# Patient Record
Sex: MALE | Race: WHITE | NOT HISPANIC OR LATINO | URBAN - METROPOLITAN AREA
[De-identification: names, ages, dates, MRNs, and addresses within clinical notes are randomized per-mention and may not be internally consistent; named-entity substitution may affect disease eponyms.]

---

## 2018-09-14 ENCOUNTER — INPATIENT (INPATIENT)
Facility: HOSPITAL | Age: 63
LOS: 12 days | Discharge: HOME HEALTH | DRG: 950 | End: 2018-09-27
Attending: SPECIALIST | Admitting: STUDENT IN AN ORGANIZED HEALTH CARE EDUCATION/TRAINING PROGRAM
Payer: COMMERCIAL

## 2018-09-14 VITALS
HEIGHT: 67 IN | RESPIRATION RATE: 14 BRPM | WEIGHT: 158.51 LBS | HEART RATE: 83 BPM | DIASTOLIC BLOOD PRESSURE: 88 MMHG | TEMPERATURE: 98 F | SYSTOLIC BLOOD PRESSURE: 151 MMHG | OXYGEN SATURATION: 98 %

## 2018-09-14 DIAGNOSIS — M43.10 SPONDYLOLISTHESIS, SITE UNSPECIFIED: ICD-10-CM

## 2018-09-14 DIAGNOSIS — Z92.89 PERSONAL HISTORY OF OTHER MEDICAL TREATMENT: Chronic | ICD-10-CM

## 2018-09-14 DIAGNOSIS — I10 ESSENTIAL (PRIMARY) HYPERTENSION: ICD-10-CM

## 2018-09-14 DIAGNOSIS — M43.20 FUSION OF SPINE, SITE UNSPECIFIED: ICD-10-CM

## 2018-09-14 DIAGNOSIS — Z98.1 ARTHRODESIS STATUS: Chronic | ICD-10-CM

## 2018-09-14 DIAGNOSIS — N28.9 DISORDER OF KIDNEY AND URETER, UNSPECIFIED: ICD-10-CM

## 2018-09-14 RX ORDER — DOCUSATE SODIUM 100 MG
100 CAPSULE ORAL THREE TIMES A DAY
Qty: 0 | Refills: 0 | Status: DISCONTINUED | OUTPATIENT
Start: 2018-09-14 | End: 2018-09-27

## 2018-09-14 RX ORDER — OXYCODONE HYDROCHLORIDE 5 MG/1
5 TABLET ORAL EVERY 4 HOURS
Qty: 0 | Refills: 0 | Status: DISCONTINUED | OUTPATIENT
Start: 2018-09-14 | End: 2018-09-14

## 2018-09-14 RX ORDER — DIPHENHYDRAMINE HYDROCHLORIDE AND LIDOCAINE HYDROCHLORIDE AND ALUMINUM HYDROXIDE AND MAGNESIUM HYDRO
15 KIT
Qty: 0 | Refills: 0 | Status: DISCONTINUED | OUTPATIENT
Start: 2018-09-14 | End: 2018-09-27

## 2018-09-14 RX ORDER — LANOLIN ALCOHOL/MO/W.PET/CERES
3 CREAM (GRAM) TOPICAL AT BEDTIME
Qty: 0 | Refills: 0 | Status: DISCONTINUED | OUTPATIENT
Start: 2018-09-14 | End: 2018-09-27

## 2018-09-14 RX ORDER — OXYCODONE HYDROCHLORIDE 5 MG/1
10 TABLET ORAL EVERY 4 HOURS
Qty: 0 | Refills: 0 | Status: DISCONTINUED | OUTPATIENT
Start: 2018-09-14 | End: 2018-09-21

## 2018-09-14 RX ORDER — GABAPENTIN 400 MG/1
600 CAPSULE ORAL AT BEDTIME
Qty: 0 | Refills: 0 | Status: DISCONTINUED | OUTPATIENT
Start: 2018-09-14 | End: 2018-09-27

## 2018-09-14 RX ORDER — FEBUXOSTAT 40 MG/1
40 TABLET ORAL DAILY
Qty: 0 | Refills: 0 | Status: DISCONTINUED | OUTPATIENT
Start: 2018-09-14 | End: 2018-09-27

## 2018-09-14 RX ORDER — PANTOPRAZOLE SODIUM 20 MG/1
40 TABLET, DELAYED RELEASE ORAL
Qty: 0 | Refills: 0 | Status: DISCONTINUED | OUTPATIENT
Start: 2018-09-14 | End: 2018-09-27

## 2018-09-14 RX ORDER — AMLODIPINE BESYLATE 2.5 MG/1
5 TABLET ORAL DAILY
Qty: 0 | Refills: 0 | Status: DISCONTINUED | OUTPATIENT
Start: 2018-09-14 | End: 2018-09-15

## 2018-09-14 RX ORDER — ACETAMINOPHEN 500 MG
975 TABLET ORAL EVERY 8 HOURS
Qty: 0 | Refills: 0 | Status: DISCONTINUED | OUTPATIENT
Start: 2018-09-14 | End: 2018-09-18

## 2018-09-14 RX ORDER — POLYETHYLENE GLYCOL 3350 17 G/17G
17 POWDER, FOR SOLUTION ORAL DAILY
Qty: 0 | Refills: 0 | Status: DISCONTINUED | OUTPATIENT
Start: 2018-09-14 | End: 2018-09-16

## 2018-09-14 RX ORDER — GABAPENTIN 400 MG/1
100 CAPSULE ORAL DAILY
Qty: 0 | Refills: 0 | Status: DISCONTINUED | OUTPATIENT
Start: 2018-09-14 | End: 2018-09-17

## 2018-09-14 RX ORDER — SERTRALINE 25 MG/1
25 TABLET, FILM COATED ORAL DAILY
Qty: 0 | Refills: 0 | Status: DISCONTINUED | OUTPATIENT
Start: 2018-09-14 | End: 2018-09-27

## 2018-09-14 RX ORDER — CHLORHEXIDINE GLUCONATE 213 G/1000ML
15 SOLUTION TOPICAL
Qty: 0 | Refills: 0 | Status: DISCONTINUED | OUTPATIENT
Start: 2018-09-14 | End: 2018-09-18

## 2018-09-14 RX ORDER — DIAZEPAM 5 MG
2 TABLET ORAL EVERY 8 HOURS
Qty: 0 | Refills: 0 | Status: DISCONTINUED | OUTPATIENT
Start: 2018-09-14 | End: 2018-09-20

## 2018-09-14 RX ORDER — LOSARTAN POTASSIUM 100 MG/1
100 TABLET, FILM COATED ORAL DAILY
Qty: 0 | Refills: 0 | Status: DISCONTINUED | OUTPATIENT
Start: 2018-09-14 | End: 2018-09-15

## 2018-09-14 RX ORDER — SENNA PLUS 8.6 MG/1
2 TABLET ORAL AT BEDTIME
Qty: 0 | Refills: 0 | Status: DISCONTINUED | OUTPATIENT
Start: 2018-09-14 | End: 2018-09-20

## 2018-09-14 RX ADMIN — GABAPENTIN 600 MILLIGRAM(S): 400 CAPSULE ORAL at 22:37

## 2018-09-14 RX ADMIN — Medication 975 MILLIGRAM(S): at 22:37

## 2018-09-14 RX ADMIN — Medication 100 MILLIGRAM(S): at 23:27

## 2018-09-14 RX ADMIN — OXYCODONE HYDROCHLORIDE 10 MILLIGRAM(S): 5 TABLET ORAL at 22:37

## 2018-09-14 RX ADMIN — Medication 975 MILLIGRAM(S): at 23:10

## 2018-09-14 RX ADMIN — Medication 3 MILLIGRAM(S): at 23:12

## 2018-09-14 RX ADMIN — OXYCODONE HYDROCHLORIDE 10 MILLIGRAM(S): 5 TABLET ORAL at 23:10

## 2018-09-14 RX ADMIN — POLYETHYLENE GLYCOL 3350 17 GRAM(S): 17 POWDER, FOR SOLUTION ORAL at 23:12

## 2018-09-14 NOTE — H&P ADULT - NSHPPHYSICALEXAM_GEN_ALL_CORE
On Neurological Examination:  Mental Status - Patient is alert, awake, oriented X3. Speech is fluent, able to  name and repeat. Normal attention and concentration.  Follows commands well and able to answer questions appropriately. Mood and affect  normal.  Cranial Nerves - VFF, PERRL, EOMI, V1-V3 intact, no gross facial asymmetry, no dysarthria, no dysphagia,  Motor Exam -   Right upper  Left upper  Right lower  Left lower   Normal muscle bulk/tone  Sensory    Intact to light touch and pinprick bilaterally. Normal JPS, vibration.Normal cortical sensation  Coord: FTN intact bilaterally   No tremors  Gait -  normal , no ataxia   DTS Reflexes:           Toes are flexor     GENERAL Exam:     Nontoxic , No Acute Distress 	  HEENT:  normocephalic, atraumatic		  LUNGS:	Clear bilaterally  No Wheeze  Decreased bilaterally	  HEART:	 Normal S1S2   No murmur RRR      	  GI/ ABDOMEN:  Soft  Non tender  EXTREMITIES:   No Edema  No Clubbing  No Cyanosis No Edema  MUSCULOSKELETAL: Normal Range of Motion	   SKIN:      Normal   No Ecchymosis PHYSICAL EXAMINATION     General: NAD, Resting Comfortable,                                  HEENT: NC/AT, EOM I, PERRLA, Normal Conjunctivae  Cardio: RRR, Normal S1-S2, No M/G/R                              Pulm: No Respiratory Distress,  Lungs CTAB                        Abdomen: Distended / NT, Soft, BS+                                                MSK: No joint swelling, Full ROM of ext                                          Ext: No C/C/E, Pulses 2+ throughout, No calf tenderness    Skin:  all skin intact                                                                 Wounds: Surgical 3 - tegederm and gauze all look CDI   Decubitus Ulcers: None Present     Neurological Examination    Cognitive: AAO x 3                                                                                                                                                  CN II - XII  intact                                                                      Sensory: Intact to light touch                                                                                               Tone: normal Throughout     Motor    LEFT    UE: SF [5/5], EF [5/5], EE [5/5], WE [5/5],  [wnl]  RIGHT UE: SF [5/5], EF [5/5], EE [5/5], WE [5/5],  [wnl]  LEFT    LE:  HF [5/5], KE [5/5], DF [5/5], EHL [5/5],  PF [5/5]  RIGHT LE:  HF [5/5], KE [5/5], DF [5/5], EHL [5/5],  PF [5/5]      Reflex:  2 + thoroughout, Price/Babinski negative     Mood/Affect: wnl

## 2018-09-14 NOTE — H&P ADULT - NSHPLABSRESULTS_GEN_ALL_CORE
Labs from 9/13/18:   K 4.2, Co2 23.9, Chlor 108, Cr 1.28, Na 141, BUN 22, WBC 8.6, H/H 13.2/38.8, Plts 123

## 2018-09-14 NOTE — H&P ADULT - NSHPSOCIALHISTORY_GEN_ALL_CORE
Denies use of ETOH, illicit drug use and tobacco use.     Independent prior to current event and used a SC for ambulation. Lives with daughter in apartment with 1 flight of stairs.

## 2018-09-14 NOTE — H&P ADULT - NSHPREVIEWOFSYSTEMS_GEN_ALL_CORE
TODAY'S SUBJECTIVE & REVIEW OF SYMPTOMS:    [   ] Constitutional WNL  [   ] Cardio WNL  [   ] Resp WNL  [   ] GI WNL  [   ] Heme WNL  [   ] Endo WNL  [   ] Skin WNL  [   ] MSK WNL  [   ] Neuro WNL  [   ] Cognitive WNL  [   ] Psych WNL    Any major surgery within past 100 days?    YES/NO    Two or more falls within past one year?      YES/NO    One fall with injury within past one year?   YES/NO REVIEW OF SYSTEMS  Constitutional: No fever, No Chills, No fatigue  HEENT: No eye pain, No visual disturbances, No difficulty hearing, No Dysphagia   Pulm: No cough, No shortness of breath  Cardio: No chest pain, No palpitations  GI:  No abdominal pain, No nausea, No vomiting, No diarrhea, No constipation, No incontinence, Last Bowel Movement __   : No dysuria, No frequency, No hematuria, No incontinence  Neuro: No headaches, No memory loss, No loss of strength, No numbness, No tremors  Skin: No itching, No rashes, No lesions   Endo: No temperature intolerance  MSK: No joint pain, No joint swelling, No muscle pain, No Neck or back pain  Psych:  No depression, No anxiety REVIEW OF SYSTEMS  Constitutional: No fever, No Chills, No fatigue  HEENT: + dental pain   Pulm: No cough, No shortness of breath  Cardio: No chest pain, No palpitations  GI:  No abdominal pain, No nausea, No vomiting, No diarrhea, No constipation  : No dysuria, No frequency,   Neuro: No headaches, No memory loss, No loss of strength, +numbnes in left LE  Skin: No itching, +rashes, +lesions   Endo: No temperature intolerance  MSK: +joint pain, No joint swelling, No muscle pain, +back pain  Psych:  No depression, No anxiety

## 2018-09-14 NOTE — H&P ADULT - ASSESSMENT
This is a 64 y/o male with h/o HTN, gouty arthritis, lumbar disc disease, OA with L4-L5 spondylolisthesis with stenosis and radiculopathy s/p L4-5 laminectomy and decompression with transforaminal lumbar interbody fusion and placement of interbody cage and posterior spinal fusion L4-5 with functional deficits including gait and ADL impairments with decreased endurance and strength.     Precautions:                                                                                  Diet:     DVT Prophylaxis:                                                                          Medical Prognosis:     Prescreen Comparison: I have reviewed the prescreen information and I found no relevant changes between the preadmission  screening and my post admission evaluation.     Expected Therapy:   P.T.        hrs/day           O. T.      hrs/day           S.L.P.        hrs/day                    P&O                                                   Excpected Frequency: 5 days/7 day period    Rehab Potential:                                           Estimated Disposition:                          ELOS:              days      Rationale For Inpatient Rehab Admission- Patient demonstrates the following:     [X] Medically appropriate for rehabilitation admission   [X] Has attainable rehab goals with an approrpriate discharge plan  [X] Has rehabilitation potential (expected to make significant improvement within a reasonable period of time)  [X] Requires close medical management by a rehab physician, rehab nursing care and comprehensive interdisciplinary team (including         PT, OT, SLP and/or prosthetics and orthotics) This is a 64 y/o male with h/o HTN, gouty arthritis, lumbar disc disease, OA with L4-L5 spondylolisthesis with stenosis and radiculopathy s/p L4-5 laminectomy and decompression with transforaminal lumbar interbody fusion and placement of interbody cage and posterior spinal fusion L4-5 with functional deficits including gait and ADL impairments with decreased endurance and strength.     COMORBIDITES/ACTIVE MEDICAL ISSUES     Gait Instability, ADL impairments and Functional impairments: start Comprehensive Rehab Program of PT/OT     Pain Mgmt - Tylenol , Oxycodone PRN    GI/Bowel Mgmt -  Continent start Colace, Senna, Miralax PRN,    Precautions / PROPHYLAXIS:   - Falls, Spinal  - ortho: Weight bearing status: WBAT   - Lungs: Aspiration, Incentive Spirometer [patient instructed at the bedside]   - Pressure injury/Skin: Turn Q2hrs while in bed, OOB to Chair, PT/OT    - DVT: None (will need to clairfy) will give, SCDs, TEDs     MEDICAL PROGNOSIS: GOOD            REHAB POTENTIAL: GOOD             ESTIMATED DISPOSITION: HOME            ELOS: 10-14 Days   EXPECTED THERAPY:     P.T. 1hr/day       O.T. 1hr/day      S.L.P. 1hr/day     P&O Unnecessary     EXP FREQUENCY: 5 days per 7 day period     PRESCREEN COMPARISION:   I have reviewed the prescreen information and I have found no relevent changes between the preadmission screening and my post admission evaulation     RATIONALE FOR INPATIENT ADMISSION - Patient demonstrates the following: (check all that apply)  [X] Medically appropriate for rehabilitation admission  [X] Has attainable rehab goals with an appropriate initial discharge plan  [X] Has rehabilitation potential (expected to make a significant improvement within a reasonable period of time)   [X] Requires close medical managment by a rehab physician, rehab nursing care, Hospitalist and comprehensive interdisciplinary team (including PT, OT, & or SLP, Prosthetics and Orthotics) This is a 62 y/o male with h/o HTN, gouty arthritis, lumbar disc disease, OA with L4-L5 spondylolisthesis with stenosis and radiculopathy s/p L4-5 laminectomy and decompression with transforaminal lumbar interbody fusion and placement of interbody cage and posterior spinal fusion L4-5 with functional deficits including gait and ADL impairments with decreased endurance and strength.     COMORBIDITES/ACTIVE MEDICAL ISSUES     Gait Instability, ADL impairments and Functional impairments 2/2 recent Lami: start Comprehensive Rehab Program of PT/OT     Pain Mgmt - Tylenol , Oxycodone PRN    GI/Bowel Mgmt -  Continent start Colace, Senna, Miralax PRN,    Precautions / PROPHYLAXIS:   - Falls, Spinal  - ortho: Weight bearing status: WBAT   - Lungs: Aspiration, Incentive Spirometer [patient instructed at the bedside]   - Pressure injury/Skin: Turn Q2hrs while in bed, OOB to Chair, PT/OT    - DVT: None (will need to clairfy) will give, SCDs, TEDs     MEDICAL PROGNOSIS: GOOD            REHAB POTENTIAL: GOOD             ESTIMATED DISPOSITION: HOME            ELOS: 10-14 Days   EXPECTED THERAPY:     P.T. 1hr/day       O.T. 1hr/day      S.L.P. 1hr/day     P&O Unnecessary     EXP FREQUENCY: 5 days per 7 day period     PRESCREEN COMPARISION:   I have reviewed the prescreen information and I have found no relevent changes between the preadmission screening and my post admission evaulation     RATIONALE FOR INPATIENT ADMISSION - Patient demonstrates the following: (check all that apply)  [X] Medically appropriate for rehabilitation admission  [X] Has attainable rehab goals with an appropriate initial discharge plan  [X] Has rehabilitation potential (expected to make a significant improvement within a reasonable period of time)   [X] Requires close medical managment by a rehab physician, rehab nursing care, Hospitalist and comprehensive interdisciplinary team (including PT, OT, & or SLP, Prosthetics and Orthotics)

## 2018-09-14 NOTE — H&P ADULT - PMH
Allergic rhinitis    Cyst of left kidney    Essential hypertension    Gouty arthritis    Lumbar disc disease    Nephrolithiasis    Osteoarthritis    Psoriasis    Radiculopathy    Renal insufficiency    Spinal stenosis    Spondylolisthesis    Varicosities of leg

## 2018-09-14 NOTE — H&P ADULT - HISTORY OF PRESENT ILLNESS
This is a 64 y/o male with h/o HTN, gouty arthritis, lumbar disc disease, OA with L4-L5 spondylolisthesis with stenosis and radiculopathy s/p L4-5 laminectomy and decompression with transforaminal lumbar interbody fusion and placement of interbody cage and posterior spinal fusion L4-5 by Dr Orlando Neville on 9/10/18 at [facility].     Patient to continue use LSO and is full WBAT. See by physical and ocupational therapists. Pt is ambulating 30 ft with RW and min to mod A, min A for UE dressing, max A for LE dressing,     Indwelling multani catheter removed 9/11, passed TOV.       Labs from 9/13/18:   K 4.2, Co2 23.9, Chlor 108, Cr 1.28, Na 141, BUN 22, WBC 8.6, H/H 13.2/38.8, Plts 123 This is a 62 y/o male with h/o HTN, gouty arthritis, lumbar disc disease, OA with L4-L5 spondylolisthesis with stenosis and radiculopathy s/p L4-5 laminectomy and decompression with transforaminal lumbar interbody fusion and placement of interbody cage and posterior spinal fusion L4-5 by Dr Orlando Neville on 9/10/18 at [facility].     Patient to continue use LSO and is full WBAT. See by physical and ocupational therapists. Pt is ambulating 30 ft with RW and min to mod A, min A for UE dressing, max A for LE dressing,     Indwelling multani catheter removed 9/11, passed TOV.           Labs from 9/13/18:   K 4.2, Co2 23.9, Chlor 108, Cr 1.28, Na 141, BUN 22, WBC 8.6, H/H 13.2/38.8, Plts 123 (14 Sep 2018 15:02)      Any Major Surgery within past 100 days? Yes     Two or > Falls within past one year?  No   One Fall with injury past one year? No     FUNCTIONAL HISTORY      CURRENT FUNCTIONAL STATUS  - Bed Mobility: Min A  - Transfers: Min A  - Gait: Min A with RW

## 2018-09-15 DIAGNOSIS — R33.9 RETENTION OF URINE, UNSPECIFIED: ICD-10-CM

## 2018-09-15 DIAGNOSIS — N20.0 CALCULUS OF KIDNEY: ICD-10-CM

## 2018-09-15 LAB
ALBUMIN SERPL ELPH-MCNC: 2.7 G/DL — LOW (ref 3.3–5)
ALP SERPL-CCNC: 95 U/L — SIGNIFICANT CHANGE UP (ref 40–120)
ALT FLD-CCNC: 26 U/L DA — SIGNIFICANT CHANGE UP (ref 10–45)
ANION GAP SERPL CALC-SCNC: 8 MMOL/L — SIGNIFICANT CHANGE UP (ref 5–17)
AST SERPL-CCNC: 27 U/L — SIGNIFICANT CHANGE UP (ref 10–40)
BASOPHILS # BLD AUTO: 0.1 K/UL — SIGNIFICANT CHANGE UP (ref 0–0.2)
BASOPHILS NFR BLD AUTO: 0.8 % — SIGNIFICANT CHANGE UP (ref 0–2)
BILIRUB SERPL-MCNC: 0.9 MG/DL — SIGNIFICANT CHANGE UP (ref 0.2–1.2)
BUN SERPL-MCNC: 21 MG/DL — SIGNIFICANT CHANGE UP (ref 7–23)
CALCIUM SERPL-MCNC: 8.5 MG/DL — SIGNIFICANT CHANGE UP (ref 8.4–10.5)
CHLORIDE SERPL-SCNC: 105 MMOL/L — SIGNIFICANT CHANGE UP (ref 96–108)
CO2 SERPL-SCNC: 28 MMOL/L — SIGNIFICANT CHANGE UP (ref 22–31)
CREAT SERPL-MCNC: 1.3 MG/DL — SIGNIFICANT CHANGE UP (ref 0.5–1.3)
EOSINOPHIL # BLD AUTO: 0.1 K/UL — SIGNIFICANT CHANGE UP (ref 0–0.5)
EOSINOPHIL NFR BLD AUTO: 1.6 % — SIGNIFICANT CHANGE UP (ref 0–6)
GLUCOSE SERPL-MCNC: 129 MG/DL — HIGH (ref 70–99)
HCT VFR BLD CALC: 37.9 % — LOW (ref 39–50)
HGB BLD-MCNC: 13.2 G/DL — SIGNIFICANT CHANGE UP (ref 13–17)
LYMPHOCYTES # BLD AUTO: 1.5 K/UL — SIGNIFICANT CHANGE UP (ref 1–3.3)
LYMPHOCYTES # BLD AUTO: 17.5 % — SIGNIFICANT CHANGE UP (ref 13–44)
MAGNESIUM SERPL-MCNC: 1.8 MG/DL — SIGNIFICANT CHANGE UP (ref 1.6–2.6)
MCHC RBC-ENTMCNC: 32.1 PG — SIGNIFICANT CHANGE UP (ref 27–34)
MCHC RBC-ENTMCNC: 34.9 GM/DL — SIGNIFICANT CHANGE UP (ref 32–36)
MCV RBC AUTO: 91.9 FL — SIGNIFICANT CHANGE UP (ref 80–100)
MONOCYTES # BLD AUTO: 0.8 K/UL — SIGNIFICANT CHANGE UP (ref 0–0.9)
MONOCYTES NFR BLD AUTO: 9.8 % — SIGNIFICANT CHANGE UP (ref 2–14)
NEUTROPHILS # BLD AUTO: 6.1 K/UL — SIGNIFICANT CHANGE UP (ref 1.8–7.4)
NEUTROPHILS NFR BLD AUTO: 70.3 % — SIGNIFICANT CHANGE UP (ref 43–77)
PHOSPHATE SERPL-MCNC: 3.7 MG/DL — SIGNIFICANT CHANGE UP (ref 2.5–4.5)
PLATELET # BLD AUTO: 135 K/UL — LOW (ref 150–400)
POTASSIUM SERPL-MCNC: 4.1 MMOL/L — SIGNIFICANT CHANGE UP (ref 3.5–5.3)
POTASSIUM SERPL-SCNC: 4.1 MMOL/L — SIGNIFICANT CHANGE UP (ref 3.5–5.3)
PROT SERPL-MCNC: 6.3 G/DL — SIGNIFICANT CHANGE UP (ref 6–8.3)
RBC # BLD: 4.12 M/UL — LOW (ref 4.2–5.8)
RBC # FLD: 11 % — SIGNIFICANT CHANGE UP (ref 10.3–14.5)
SODIUM SERPL-SCNC: 141 MMOL/L — SIGNIFICANT CHANGE UP (ref 135–145)
WBC # BLD: 8.6 K/UL — SIGNIFICANT CHANGE UP (ref 3.8–10.5)
WBC # FLD AUTO: 8.6 K/UL — SIGNIFICANT CHANGE UP (ref 3.8–10.5)

## 2018-09-15 PROCEDURE — 99223 1ST HOSP IP/OBS HIGH 75: CPT

## 2018-09-15 PROCEDURE — 99223 1ST HOSP IP/OBS HIGH 75: CPT | Mod: GC

## 2018-09-15 RX ORDER — TAMSULOSIN HYDROCHLORIDE 0.4 MG/1
0.4 CAPSULE ORAL ONCE
Qty: 0 | Refills: 0 | Status: COMPLETED | OUTPATIENT
Start: 2018-09-15 | End: 2018-09-15

## 2018-09-15 RX ORDER — AMLODIPINE BESYLATE 2.5 MG/1
5 TABLET ORAL DAILY
Qty: 0 | Refills: 0 | Status: DISCONTINUED | OUTPATIENT
Start: 2018-09-15 | End: 2018-09-27

## 2018-09-15 RX ORDER — LOSARTAN POTASSIUM 100 MG/1
100 TABLET, FILM COATED ORAL DAILY
Qty: 0 | Refills: 0 | Status: DISCONTINUED | OUTPATIENT
Start: 2018-09-15 | End: 2018-09-17

## 2018-09-15 RX ORDER — TAMSULOSIN HYDROCHLORIDE 0.4 MG/1
0.4 CAPSULE ORAL AT BEDTIME
Qty: 0 | Refills: 0 | Status: DISCONTINUED | OUTPATIENT
Start: 2018-09-15 | End: 2018-09-25

## 2018-09-15 RX ORDER — MINERAL OIL
133 OIL (ML) MISCELLANEOUS ONCE
Qty: 0 | Refills: 0 | Status: COMPLETED | OUTPATIENT
Start: 2018-09-15 | End: 2018-09-15

## 2018-09-15 RX ADMIN — Medication 975 MILLIGRAM(S): at 13:00

## 2018-09-15 RX ADMIN — Medication 5 MILLIGRAM(S): at 17:13

## 2018-09-15 RX ADMIN — Medication 975 MILLIGRAM(S): at 06:37

## 2018-09-15 RX ADMIN — Medication 100 MILLIGRAM(S): at 22:09

## 2018-09-15 RX ADMIN — CHLORHEXIDINE GLUCONATE 15 MILLILITER(S): 213 SOLUTION TOPICAL at 06:37

## 2018-09-15 RX ADMIN — TAMSULOSIN HYDROCHLORIDE 0.4 MILLIGRAM(S): 0.4 CAPSULE ORAL at 22:09

## 2018-09-15 RX ADMIN — OXYCODONE HYDROCHLORIDE 10 MILLIGRAM(S): 5 TABLET ORAL at 09:15

## 2018-09-15 RX ADMIN — FEBUXOSTAT 40 MILLIGRAM(S): 40 TABLET ORAL at 12:14

## 2018-09-15 RX ADMIN — Medication 10 MILLIGRAM(S): at 22:10

## 2018-09-15 RX ADMIN — OXYCODONE HYDROCHLORIDE 10 MILLIGRAM(S): 5 TABLET ORAL at 08:45

## 2018-09-15 RX ADMIN — Medication 975 MILLIGRAM(S): at 12:59

## 2018-09-15 RX ADMIN — AMLODIPINE BESYLATE 5 MILLIGRAM(S): 2.5 TABLET ORAL at 06:37

## 2018-09-15 RX ADMIN — SERTRALINE 25 MILLIGRAM(S): 25 TABLET, FILM COATED ORAL at 12:14

## 2018-09-15 RX ADMIN — OXYCODONE HYDROCHLORIDE 10 MILLIGRAM(S): 5 TABLET ORAL at 13:00

## 2018-09-15 RX ADMIN — PANTOPRAZOLE SODIUM 40 MILLIGRAM(S): 20 TABLET, DELAYED RELEASE ORAL at 06:37

## 2018-09-15 RX ADMIN — TAMSULOSIN HYDROCHLORIDE 0.4 MILLIGRAM(S): 0.4 CAPSULE ORAL at 04:15

## 2018-09-15 RX ADMIN — Medication 100 MILLIGRAM(S): at 06:37

## 2018-09-15 RX ADMIN — Medication 100 MILLIGRAM(S): at 12:59

## 2018-09-15 RX ADMIN — Medication 975 MILLIGRAM(S): at 22:09

## 2018-09-15 RX ADMIN — Medication 133 MILLILITER(S): at 04:15

## 2018-09-15 RX ADMIN — Medication 3 MILLIGRAM(S): at 22:08

## 2018-09-15 RX ADMIN — Medication 975 MILLIGRAM(S): at 22:39

## 2018-09-15 RX ADMIN — GABAPENTIN 600 MILLIGRAM(S): 400 CAPSULE ORAL at 22:12

## 2018-09-15 RX ADMIN — Medication 975 MILLIGRAM(S): at 08:26

## 2018-09-15 RX ADMIN — GABAPENTIN 100 MILLIGRAM(S): 400 CAPSULE ORAL at 12:14

## 2018-09-15 RX ADMIN — OXYCODONE HYDROCHLORIDE 10 MILLIGRAM(S): 5 TABLET ORAL at 12:56

## 2018-09-15 RX ADMIN — CHLORHEXIDINE GLUCONATE 15 MILLILITER(S): 213 SOLUTION TOPICAL at 17:13

## 2018-09-15 RX ADMIN — SENNA PLUS 2 TABLET(S): 8.6 TABLET ORAL at 22:09

## 2018-09-15 NOTE — CHART NOTE - NSCHARTNOTEFT_GEN_A_CORE
Nurse called,     patient newly admitted, had a PVR > 500 and requesting SC order. Order placed. Shortly after, nurse unable to place mlutani. Patient was voiding spontaneous prior to discharge and arrival to [place]. Now I requested, reattempt with smaller Martiniquais and different Nurse to place.     At bedside, 14 Serbian. Patient resting comfortable. Nurse was able to pass 14 Serbian with little resistance and drainage of yellow urine filled basin. Starting flomax first now then qhs and will continue multani for now and have urology come assess in the am. Will additionally provide enema since patient last Bowel movement has seen been prolonged.

## 2018-09-15 NOTE — CONSULT NOTE ADULT - ASSESSMENT
62 y/o male with h/o HTN, gouty arthritis, lumbar disc disease, OA with L4-L5 spondylolisthesis with stenosis and radiculopathy s/p L4-5 laminectomy and decompression with transforaminal lumbar interbody fusion and placement of interbody cage and posterior spinal fusion L4-5 with functional deficits including gait and ADL impairments with decreased endurance and strength.     1. s/p laminectomy and decompression w transforaminal lumbar interbody fusion cw pt/ot and pain management  2. essential HTN: stable cw current meds  3. gout cw current meds stable  4. depression cw zoloft  5. dvt ppx: scds b/l 62 y/o male with h/o HTN, gouty arthritis, lumbar disc disease, OA with L4-L5 spondylolisthesis with stenosis and radiculopathy s/p L4-5 laminectomy and decompression with transforaminal lumbar interbody fusion and placement of interbody cage and posterior spinal fusion L4-5 with functional deficits including gait and ADL impairments with decreased endurance and strength.     1. s/p laminectomy and decompression w transforaminal lumbar interbody fusion cw pt/ot and pain management  2. essential HTN: stable cw current meds  3. gout cw current meds stable  4. depression cw zoloft  5. dvt ppx: scds b/l  6. urinary retention , bph cw flomax fu urology

## 2018-09-15 NOTE — CONSULT NOTE ADULT - SUBJECTIVE AND OBJECTIVE BOX
Patient is a 63y old  Male who presents with a chief complaint of s/p transforaminal, L4-5, lumbar interbody fusion (14 Sep 2018 15:02)      HPI:  This is a 64 y/o male with h/o HTN, gouty arthritis, lumbar disc disease, OA with L4-L5 spondylolisthesis with stenosis and radiculopathy s/p L4-5 laminectomy and decompression with transforaminal lumbar interbody fusion and placement of interbody cage and posterior spinal fusion L4-5 by Dr Orlando Neville on 9/10/18 at [facility].     Patient to continue use LSO and is full WBAT. See by physical and ocupational therapists. Pt is ambulating 30 ft with RW and min to mod A, min A for UE dressing, max A for LE dressing,     Indwelling multani catheter removed , passed TOV. last evening found to have retention, difficulty with straight cath. 14 Fr multani placed. patient has history of stone disease, denies prostate issues/voiding trouble. recent severe constipation             PAST MEDICAL & SURGICAL HISTORY:  Spinal stenosis  Radiculopathy  Spondylolisthesis  Varicosities of leg  Cyst of left kidney  Renal insufficiency  Psoriasis  Osteoarthritis  Nephrolithiasis  Lumbar disc disease  Gouty arthritis  Essential hypertension  Allergic rhinitis  S/P lumbar fusion  History of dental surgery      REVIEW OF SYSTEMS:    CONSTITUTIONAL:  no fever or chills  HEENT: No visual changes  ENDO: No sweating  NECK: No pain or stiffness  MUSCULOSKELETAL:  s/p laminectomy  RESPIRATORY: No shortness of breath  CARDIOVASCULAR: No chest pain  GASTROINTESTINAL: No abdominal or epigastric pain. No nausea, vomiting,  yes constipation.   NEUROLOGICAL: No mental status changes  PSYCH: No depression, no mood changes  SKIN: No itching      MEDICATIONS  (STANDING):  acetaminophen   Tablet .. 975 milliGRAM(s) Oral every 8 hours  amLODIPine   Tablet 5 milliGRAM(s) Oral daily  chlorhexidine 0.12% Liquid 15 milliLiter(s) Swish and Spit two times a day  docusate sodium 100 milliGRAM(s) Oral three times a day  febuxostat 40 milliGRAM(s) Oral daily  gabapentin 600 milliGRAM(s) Oral at bedtime  gabapentin 100 milliGRAM(s) Oral daily  losartan 100 milliGRAM(s) Oral daily  melatonin 3 milliGRAM(s) Oral at bedtime  pantoprazole    Tablet 40 milliGRAM(s) Oral before breakfast  senna 2 Tablet(s) Oral at bedtime  sertraline 25 milliGRAM(s) Oral daily  tamsulosin 0.4 milliGRAM(s) Oral at bedtime    MEDICATIONS  (PRN):  diazepam    Tablet 2 milliGRAM(s) Oral every 8 hours PRN spasm  FIRST- Mouthwash  BLM 15 milliLiter(s) Swish and Spit two times a day PRN Mouth Care  oxyCODONE    IR 5 milliGRAM(s) Oral every 4 hours PRN Moderate Pain (4 - 6)  oxyCODONE    IR 10 milliGRAM(s) Oral every 4 hours PRN Severe Pain (7 - 10)  polyethylene glycol 3350 17 Gram(s) Oral daily PRN Constipation      Allergies    No Known Allergies    Intolerances        SOCIAL HISTORY: No illicit drug use    FAMILY HISTORY:        T(C): 36.8 (09-15-18 @ 08:15), Max: 36.9 (18 @ 18:56)  T(F): 98.2 (09-15-18 @ 08:15), Max: 98.5 (18 @ 18:56)  HR: 83 (09-15-18 @ 08:15) (83 - 91)  BP: 112/70 (09-15-18 @ 08:15) (112/70 - 151/88)  RR: 14 (09-15-18 @ 08:15) (14 - 14)  SpO2: 94% (09-15-18 @ 08:15) (94% - 98%)      PHYSICAL EXAM:    Constitutional: alert, no acute distress  Back: No CVA tenderness  Respiratory: No accessory respiratory muscle use  Abd:  NT/ND  no organomegaly  obese, soft  : no bladder distention, multani yellow, uncirc testes benign  Extremities: no edema  Neurological: A/O x 3  Psychiatric: Normal mood, normal affect  Skin: No rashes      18 @ 07:01  -  09-15-18 @ 07:00  --------------------------------------------------------  IN:  Total IN: 0 mL    OUT:    Incontinent per Collection Ba mL  Total OUT: 600 mL    Total NET: -600 mL          Height (cm): 170.18 (18 @ 18:56)  Weight (kg): 71.9 (18 @ 18:56)  BMI (kg/m2): 24.8 (18 @ 18:56)  BSA (m2): 1.83 (18 @ 18:56)    LABS:                        13.2   8.6   )-----------( 135      ( 15 Sep 2018 06:20 )             37.9     09-15    141  |  105  |  21  ----------------------------<  129<H>  4.1   |  28  |  1.30    Ca    8.5      15 Sep 2018 06:20  Phos  3.7     09-15  Mg     1.8     09-15    TPro  6.3  /  Alb  2.7<L>  /  TBili  0.9  /  DBili  x   /  AST  27  /  ALT  26  /  AlkPhos  95  09-15              RADIOLOGY & ADDITIONAL STUDIES:

## 2018-09-15 NOTE — CONSULT NOTE ADULT - SUBJECTIVE AND OBJECTIVE BOX
Patient is a 63y old  Male who presents with a chief complaint of s/p transforaminal, L4-5, lumbar interbody fusion (15 Sep 2018 12:21)    HPI:  This is a 64 y/o male with h/o HTN, gouty arthritis, lumbar disc disease, OA with L4-L5 spondylolisthesis with stenosis and radiculopathy s/p L4-5 laminectomy and decompression with transforaminal lumbar interbody fusion and placement of interbody cage and posterior spinal fusion L4-5 by Dr Orlando Neville on 9/10/18 at [facility].     Patient to continue use LSO and is full WBAT. See by physical and ocupational therapists. Pt is ambulating 30 ft with RW and min to mod A, min A for UE dressing, max A for LE dressing,     Indwelling multani catheter removed 9/11, passed TOV.       Labs from 9/13/18:   K 4.2, Co2 23.9, Chlor 108, Cr 1.28, Na 141, BUN 22, WBC 8.6, H/H 13.2/38.8, Plts 123 (14 Sep 2018 15:02)    PAST MEDICAL & SURGICAL HISTORY:  Spinal stenosis  Radiculopathy  Spondylolisthesis  Varicosities of leg  Cyst of left kidney  Renal insufficiency  Psoriasis  Osteoarthritis  Nephrolithiasis  Lumbar disc disease  Gouty arthritis  Essential hypertension  Allergic rhinitis  S/P lumbar fusion  History of dental surgery    SOCIAL HISTORY:  Tobacco Usage:  ( x  ) never smoked   (   ) former smoker   (   ) current smoker  (     ) pack years    Tobacco Quit Date:  Substance Use (Street drugs): (x  ) never used  (  ) other:  Alcohol Usage: none    Family history reviewed and otherwise non-contributory    ALLERGIES:  No Known Allergies    MEDICATIONS  (STANDING):  acetaminophen   Tablet .. 975 milliGRAM(s) Oral every 8 hours  amLODIPine   Tablet 5 milliGRAM(s) Oral daily  bisacodyl 5 milliGRAM(s) Oral every 12 hours  chlorhexidine 0.12% Liquid 15 milliLiter(s) Swish and Spit two times a day  docusate sodium 100 milliGRAM(s) Oral three times a day  febuxostat 40 milliGRAM(s) Oral daily  gabapentin 600 milliGRAM(s) Oral at bedtime  gabapentin 100 milliGRAM(s) Oral daily  losartan 100 milliGRAM(s) Oral daily  melatonin 3 milliGRAM(s) Oral at bedtime  pantoprazole    Tablet 40 milliGRAM(s) Oral before breakfast  senna 2 Tablet(s) Oral at bedtime  sertraline 25 milliGRAM(s) Oral daily  tamsulosin 0.4 milliGRAM(s) Oral at bedtime    MEDICATIONS  (PRN):  bisacodyl Suppository 10 milliGRAM(s) Rectal once PRN Constipation  diazepam    Tablet 2 milliGRAM(s) Oral every 8 hours PRN spasm  FIRST- Mouthwash  BLM 15 milliLiter(s) Swish and Spit two times a day PRN Mouth Care  oxyCODONE    IR 5 milliGRAM(s) Oral every 4 hours PRN Moderate Pain (4 - 6)  oxyCODONE    IR 10 milliGRAM(s) Oral every 4 hours PRN Severe Pain (7 - 10)  polyethylene glycol 3350 17 Gram(s) Oral daily PRN Constipation    Review of Systems: Refer to HPI for pertinent positives and negatives. All other ROS reviewed and negative except as otherwise stated above.    Vital Signs Last 24 Hrs  T(F): 98.2 (15 Sep 2018 08:15), Max: 98.5 (14 Sep 2018 18:56)  HR: 83 (15 Sep 2018 08:15) (83 - 91)  BP: 112/70 (15 Sep 2018 08:15) (112/70 - 151/88)  RR: 14 (15 Sep 2018 08:15) (14 - 14)  SpO2: 94% (15 Sep 2018 08:15) (94% - 98%)  I&O's Summary    14 Sep 2018 07:01  -  15 Sep 2018 07:00  --------------------------------------------------------  IN: 0 mL / OUT: 600 mL / NET: -600 mL      PHYSICAL EXAM:  GENERAL: NAD, well-groomed, well-developed  HEAD:  Atraumatic, Normocephalic  EYES: EOMI, PERRLA, conjunctiva and sclera clear  ENMT: Moist mucous membranes, Good dentition  NECK: Supple, No JVD  CHEST/LUNG: Clear to auscultation bilaterally; No rales, rhonchi, wheezing, or rubs  HEART: Regular rate and rhythm; S1/S2, No murmurs, rubs, or gallops  ABDOMEN: Soft, Nontender, Nondistended; Bowel sounds present  VASCULAR: Normal pulses, Normal capillary refill  EXTREMITIES: No cyanosis, No edema, moves all 4 extremities  LYMPH: No lymphadenopathy noted  SKIN: Warm, Intact  PSYCH: Normal mood and affect  NERVOUS SYSTEM:  A/O x3, Good concentration      LABS:                        13.2   8.6   )-----------( 135      ( 15 Sep 2018 06:20 )             37.9     09-15    141  |  105  |  21  ----------------------------<  129  4.1   |  28  |  1.30    Ca    8.5      15 Sep 2018 06:20  Phos  3.7     09-15  Mg     1.8     09-15    TPro  6.3  /  Alb  2.7  /  TBili  0.9  /  DBili  x   /  AST  27  /  ALT  26  /  AlkPhos  95  09-15    eGFR if Non African American: 59 mL/min/1.73M2 (09-15-18 @ 06:20)  eGFR if African American: 68 mL/min/1.73M2 (09-15-18 @ 06:20)                  CAPILLARY BLOOD GLUCOSE              RADIOLOGY & ADDITIONAL TESTS:    Care Discussed with Consultants/Other Providers:

## 2018-09-15 NOTE — CONSULT NOTE ADULT - PROBLEM SELECTOR RECOMMENDATION 9
likely due to BPH and constipation. flomax started. treat constipation. consider void trial when bowel function improves and mobilizing better

## 2018-09-16 PROCEDURE — 74019 RADEX ABDOMEN 2 VIEWS: CPT | Mod: 26

## 2018-09-16 PROCEDURE — 99233 SBSQ HOSP IP/OBS HIGH 50: CPT | Mod: GC

## 2018-09-16 PROCEDURE — 99232 SBSQ HOSP IP/OBS MODERATE 35: CPT

## 2018-09-16 RX ORDER — LUBIPROSTONE 24 UG/1
24 CAPSULE, GELATIN COATED ORAL
Qty: 0 | Refills: 0 | Status: DISCONTINUED | OUTPATIENT
Start: 2018-09-16 | End: 2018-09-20

## 2018-09-16 RX ORDER — MAGNESIUM HYDROXIDE 400 MG/1
30 TABLET, CHEWABLE ORAL DAILY
Qty: 0 | Refills: 0 | Status: DISCONTINUED | OUTPATIENT
Start: 2018-09-16 | End: 2018-09-27

## 2018-09-16 RX ORDER — LACTULOSE 10 G/15ML
20 SOLUTION ORAL ONCE
Qty: 0 | Refills: 0 | Status: COMPLETED | OUTPATIENT
Start: 2018-09-16 | End: 2018-09-16

## 2018-09-16 RX ORDER — POLYETHYLENE GLYCOL 3350 17 G/17G
17 POWDER, FOR SOLUTION ORAL DAILY
Qty: 0 | Refills: 0 | Status: DISCONTINUED | OUTPATIENT
Start: 2018-09-16 | End: 2018-09-16

## 2018-09-16 RX ADMIN — Medication 975 MILLIGRAM(S): at 05:58

## 2018-09-16 RX ADMIN — Medication 100 MILLIGRAM(S): at 14:40

## 2018-09-16 RX ADMIN — Medication 100 MILLIGRAM(S): at 05:58

## 2018-09-16 RX ADMIN — Medication 975 MILLIGRAM(S): at 14:38

## 2018-09-16 RX ADMIN — Medication 1 ENEMA: at 00:04

## 2018-09-16 RX ADMIN — POLYETHYLENE GLYCOL 3350 17 GRAM(S): 17 POWDER, FOR SOLUTION ORAL at 12:34

## 2018-09-16 RX ADMIN — CHLORHEXIDINE GLUCONATE 15 MILLILITER(S): 213 SOLUTION TOPICAL at 05:59

## 2018-09-16 RX ADMIN — LUBIPROSTONE 24 MICROGRAM(S): 24 CAPSULE, GELATIN COATED ORAL at 18:11

## 2018-09-16 RX ADMIN — SERTRALINE 25 MILLIGRAM(S): 25 TABLET, FILM COATED ORAL at 12:34

## 2018-09-16 RX ADMIN — Medication 975 MILLIGRAM(S): at 21:42

## 2018-09-16 RX ADMIN — CHLORHEXIDINE GLUCONATE 15 MILLILITER(S): 213 SOLUTION TOPICAL at 18:11

## 2018-09-16 RX ADMIN — GABAPENTIN 100 MILLIGRAM(S): 400 CAPSULE ORAL at 14:39

## 2018-09-16 RX ADMIN — LOSARTAN POTASSIUM 100 MILLIGRAM(S): 100 TABLET, FILM COATED ORAL at 05:58

## 2018-09-16 RX ADMIN — GABAPENTIN 600 MILLIGRAM(S): 400 CAPSULE ORAL at 21:43

## 2018-09-16 RX ADMIN — AMLODIPINE BESYLATE 5 MILLIGRAM(S): 2.5 TABLET ORAL at 05:58

## 2018-09-16 RX ADMIN — TAMSULOSIN HYDROCHLORIDE 0.4 MILLIGRAM(S): 0.4 CAPSULE ORAL at 21:43

## 2018-09-16 RX ADMIN — Medication 5 MILLIGRAM(S): at 05:58

## 2018-09-16 RX ADMIN — Medication 975 MILLIGRAM(S): at 15:00

## 2018-09-16 RX ADMIN — PANTOPRAZOLE SODIUM 40 MILLIGRAM(S): 20 TABLET, DELAYED RELEASE ORAL at 06:00

## 2018-09-16 RX ADMIN — Medication 975 MILLIGRAM(S): at 06:28

## 2018-09-16 RX ADMIN — Medication 3 MILLIGRAM(S): at 21:43

## 2018-09-16 RX ADMIN — Medication 975 MILLIGRAM(S): at 23:34

## 2018-09-16 RX ADMIN — FEBUXOSTAT 40 MILLIGRAM(S): 40 TABLET ORAL at 12:34

## 2018-09-16 RX ADMIN — LACTULOSE 20 GRAM(S): 10 SOLUTION ORAL at 12:34

## 2018-09-16 RX ADMIN — Medication 100 MILLIGRAM(S): at 21:42

## 2018-09-16 NOTE — PROVIDER CONTACT NOTE (OTHER) - ACTION/TREATMENT ORDERED:
disimpaction unsuccessful by dr. luz and dr. arango.  abd. xray ordered.  soap suds enema ordered and administered with no result of bm

## 2018-09-16 NOTE — PROGRESS NOTE ADULT - SUBJECTIVE AND OBJECTIVE BOX
Patient is a 63y old  Male who presents with a chief complaint of s/p transforaminal, L4-5, lumbar interbody fusion (15 Sep 2018 13:17)    HPI:  This is a 64 y/o male with h/o HTN, gouty arthritis, lumbar disc disease, OA with L4-L5 spondylolisthesis with stenosis and radiculopathy s/p L4-5 laminectomy and decompression with transforaminal lumbar interbody fusion and placement of interbody cage and posterior spinal fusion L4-5 by Dr Orlando Neville on 9/10/18 at [facility].     Patient to continue use LSO and is full WBAT. See by physical and ocupational therapists. Pt is ambulating 30 ft with RW and min to mod A, min A for UE dressing, max A for LE dressing,     Indwelling multani catheter removed , passed TOV.     multani replaced. c/o constipation             Interval Events:  Patient seen and examined at bedside.    MEDICATIONS:  MEDICATIONS  (STANDING):  acetaminophen   Tablet .. 975 milliGRAM(s) Oral every 8 hours  amLODIPine   Tablet 5 milliGRAM(s) Oral daily  bisacodyl 5 milliGRAM(s) Oral every 12 hours  chlorhexidine 0.12% Liquid 15 milliLiter(s) Swish and Spit two times a day  docusate sodium 100 milliGRAM(s) Oral three times a day  febuxostat 40 milliGRAM(s) Oral daily  gabapentin 600 milliGRAM(s) Oral at bedtime  gabapentin 100 milliGRAM(s) Oral daily  losartan 100 milliGRAM(s) Oral daily  melatonin 3 milliGRAM(s) Oral at bedtime  pantoprazole    Tablet 40 milliGRAM(s) Oral before breakfast  senna 2 Tablet(s) Oral at bedtime  sertraline 25 milliGRAM(s) Oral daily  tamsulosin 0.4 milliGRAM(s) Oral at bedtime    MEDICATIONS  (PRN):  diazepam    Tablet 2 milliGRAM(s) Oral every 8 hours PRN spasm  FIRST- Mouthwash  BLM 15 milliLiter(s) Swish and Spit two times a day PRN Mouth Care  oxyCODONE    IR 5 milliGRAM(s) Oral every 4 hours PRN Moderate Pain (4 - 6)  oxyCODONE    IR 10 milliGRAM(s) Oral every 4 hours PRN Severe Pain (7 - 10)  polyethylene glycol 3350 17 Gram(s) Oral daily PRN Constipation      Allergies    No Known Allergies    Intolerances        T(C): 36.5 (09-15-18 @ 20:18), Max: 36.9 (18 @ 18:56)  T(F): 97.7 (09-15-18 @ 20:18), Max: 98.5 (18 @ 18:56)  HR: 75 (18 @ 05:57) (75 - 91)  BP: 112/74 (18 @ 05:57) (111/70 - 151/88)  RR: 14 (18 @ 05:57) (14 - 14)  SpO2: 95% (18 @ 05:57) (94% - 98%)          18 @ 07:01  -  09-15-18 @ 07:00  --------------------------------------------------------  IN:  Total IN: 0 mL    OUT:    Incontinent per Collection Ba mL  Total OUT: 600 mL    Total NET: -600 mL      09-15-18 @ 07:01  -  18 @ 07:00  --------------------------------------------------------  IN:  Total IN: 0 mL    OUT:    Incontinent per Collection Ba mL  Total OUT: 1100 mL    Total NET: -1100 mL          LABS:      CBC Full  -  ( 15 Sep 2018 06:20 )  WBC Count : 8.6 K/uL  Hemoglobin : 13.2 g/dL  Hematocrit : 37.9 %  Platelet Count - Automated : 135 K/uL  Mean Cell Volume : 91.9 fl  Mean Cell Hemoglobin : 32.1 pg  Mean Cell Hemoglobin Concentration : 34.9 gm/dL  Auto Neutrophil # : 6.1 K/uL  Auto Lymphocyte # : 1.5 K/uL  Auto Monocyte # : 0.8 K/uL  Auto Eosinophil # : 0.1 K/uL  Auto Basophil # : 0.1 K/uL  Auto Neutrophil % : 70.3 %  Auto Lymphocyte % : 17.5 %  Auto Monocyte % : 9.8 %  Auto Eosinophil % : 1.6 %  Auto Basophil % : 0.8 %    09-15    141  |  105  |  21  ----------------------------<  129<H>  4.1   |  28  |  1.30    Ca    8.5      15 Sep 2018 06:20  Phos  3.7     09-15  Mg     1.8     09-15    TPro  6.3  /  Alb  2.7<L>  /  TBili  0.9  /  DBili  x   /  AST  27  /  ALT  26  /  AlkPhos  95  09-15                  Physical Exam    Constitutional: alert, no acute distress    Abdomen: soft, nontender, nondistended, no HSM    Genitourinary: no bladder distention

## 2018-09-16 NOTE — CONSULT NOTE ADULT - SUBJECTIVE AND OBJECTIVE BOX
Patient seen and examined.  Full consult dictated and will be available shortly.    Elderly male with multiple medical problems recently underwent spinal fusion L4-L5 at [facility] now admitted for acute rehab; complaining of constipation.  Some lower abdominal discomfort.  No nausea or vomiting.  Abdomen: nondistended, mild lower abdominal tenderness.    Impression: Constipation, possibly opiate induced    Plan:  1. Agree with starting patient on Amitiza 24 mcg PO BID with meals  2. Monitor bowel movements  3. Continue present diet as tolerated

## 2018-09-16 NOTE — PROGRESS NOTE ADULT - ASSESSMENT
This is a 64 y/o male with h/o HTN, gouty arthritis, lumbar disc disease, OA with L4-L5 spondylolisthesis with stenosis and radiculopathy s/p L4-5 laminectomy and decompression with transforaminal lumbar interbody fusion and placement of interbody cage and posterior spinal fusion L4-5 with functional deficits including gait and ADL impairments with decreased endurance and strength.     COMORBIDITES/ACTIVE MEDICAL ISSUES     Gait Instability, ADL impairments and Functional impairments 2/2 recent Lami: start Comprehensive Rehab Program of PT/OT     Pain Mgmt - Tylenol , Oxycodone PRN    GI/Bowel Mgmt -  STAT abdominal x-ray. continue laxatives. GI consult.   Precautions / PROPHYLAXIS:   - Falls, Spinal  - ortho: Weight bearing status: WBAT   - Lungs: Aspiration, Incentive Spirometer [patient instructed at the bedside]   - Pressure injury/Skin: Turn Q2hrs while in bed, OOB to Chair, PT/OT    - DVT: CX due to spine surgery-  SCDs, TEDs

## 2018-09-16 NOTE — PROVIDER CONTACT NOTE (OTHER) - SITUATION
last bm for patient is 9/10/18.  patient received suppository and fleet enema on previous night shift.

## 2018-09-16 NOTE — PROGRESS NOTE ADULT - SUBJECTIVE AND OBJECTIVE BOX
This is a 64 y/o male with h/o HTN, gouty arthritis, lumbar disc disease, OA with L4-L5 spondylolisthesis with stenosis and radiculopathy s/p L4-5 laminectomy and decompression with transforaminal lumbar interbody fusion and placement of interbody cage and posterior spinal fusion L4-5 by Dr Orlando Neville on 9/10/18 at [facility].     Siegel inserted due to retention, difficult cath.  Continues to have constipation, no improvement with stool softeners, dulcolax, multiple enemas.   Manual disimpaction attempted with stool in rectal vault but to soft to remove      RECENT LABS/IMAGING  CBC Full  -  ( 15 Sep 2018 06:20 )  WBC Count : 8.6 K/uL  Hemoglobin : 13.2 g/dL  Hematocrit : 37.9 %  Platelet Count - Automated : 135 K/uL  Mean Cell Volume : 91.9 fl  Mean Cell Hemoglobin : 32.1 pg  Mean Cell Hemoglobin Concentration : 34.9 gm/dL  Auto Neutrophil # : 6.1 K/uL  Auto Lymphocyte # : 1.5 K/uL  Auto Monocyte # : 0.8 K/uL  Auto Eosinophil # : 0.1 K/uL  Auto Basophil # : 0.1 K/uL  Auto Neutrophil % : 70.3 %  Auto Lymphocyte % : 17.5 %  Auto Monocyte % : 9.8 %  Auto Eosinophil % : 1.6 %  Auto Basophil % : 0.8 %    09-15    141  |  105  |  21  ----------------------------<  129<H>  4.1   |  28  |  1.30    Ca    8.5      15 Sep 2018 06:20  Phos  3.7     09-15  Mg     1.8     09-15    TPro  6.3  /  Alb  2.7<L>  /  TBili  0.9  /  DBili  x   /  AST  27  /  ALT  26  /  AlkPhos  95  09-15        VITALS  T(C): 36.8 (09-16-18 @ 09:46), Max: 36.8 (09-16-18 @ 09:46)  HR: 80 (09-16-18 @ 09:46) (75 - 80)  BP: 124/77 (09-16-18 @ 09:46) (111/70 - 124/77)  RR: 15 (09-16-18 @ 09:46) (14 - 15)  SpO2: 95% (09-16-18 @ 09:46) (95% - 97%)  Wt(kg): --    MEDICATIONS   acetaminophen   Tablet .. 975 milliGRAM(s) every 8 hours  amLODIPine   Tablet 5 milliGRAM(s) daily  bisacodyl 5 milliGRAM(s) every 12 hours  chlorhexidine 0.12% Liquid 15 milliLiter(s) two times a day  diazepam    Tablet 2 milliGRAM(s) every 8 hours PRN  docusate sodium 100 milliGRAM(s) three times a day  febuxostat 40 milliGRAM(s) daily  FIRST- Mouthwash  BLM 15 milliLiter(s) two times a day PRN  gabapentin 600 milliGRAM(s) at bedtime  gabapentin 100 milliGRAM(s) daily  lactulose Syrup 20 Gram(s) once  losartan 100 milliGRAM(s) daily  magnesium hydroxide Suspension 30 milliLiter(s) daily PRN  melatonin 3 milliGRAM(s) at bedtime  oxyCODONE    IR 5 milliGRAM(s) every 4 hours PRN  oxyCODONE    IR 10 milliGRAM(s) every 4 hours PRN  pantoprazole    Tablet 40 milliGRAM(s) before breakfast  polyethylene glycol 3350 17 Gram(s) daily PRN  senna 2 Tablet(s) at bedtime  sertraline 25 milliGRAM(s) daily  tamsulosin 0.4 milliGRAM(s) at bedtime      	General: NAD, Resting Comfortable,                                  	HEENT: NC/AT, EOM I, PERRLA, Normal Conjunctivae  	Cardio: RRR, Normal S1-S2, No M/G/R                              	Pulm: No Respiratory Distress,  Lungs CTAB                        	Abdomen: Distended, slight tenderness.                                            	MSK: No joint swelling, Full ROM of ext                                          	Ext: No C/C/E, Pulses 2+ throughout, No calf tenderness    	Skin:  all skin intact                                                                 	Wounds: Surgical 3 - tegederm and gauze all look CDI   	Decubitus Ulcers: None Present     	Neurological Examination    	Cognitive: AAO x 3                                                                                                                                                  	CN II - XII  intact                                                                      	Sensory: Intact to light touch                                                                                               	Tone: normal Throughout     	Motor    	LEFT    UE: SF [5/5], EF [5/5], EE [5/5], WE [5/5],  [wnl]  	RIGHT UE: SF [5/5], EF [5/5], EE [5/5], WE [5/5],  [wnl]  	LEFT    LE:  HF [5/5], KE [5/5], DF [5/5], EHL [5/5],  PF [5/5]  	RIGHT LE:  HF [5/5], KE [5/5], DF [5/5], EHL [5/5],  PF [5/5]      	Reflex:  2 + , Albert/Babinski negative     	Mood/Affect: wnl  --------------------------------------------------------------------

## 2018-09-16 NOTE — PROGRESS NOTE ADULT - SUBJECTIVE AND OBJECTIVE BOX
This is a 62 y/o male with h/o HTN, gouty arthritis, lumbar disc disease, OA with L4-L5 spondylolisthesis with stenosis and radiculopathy s/p L4-5 laminectomy and decompression with transforaminal lumbar interbody fusion and placement of interbody cage and posterior spinal fusion L4-5 by Dr Orlando Neville on 9/10/18 at [facility].     Subjective  No BM for 7 days.           PAST MEDICAL & SURGICAL HISTORY:  Spinal stenosis  Radiculopathy  Spondylolisthesis  Varicosities of leg  Cyst of left kidney  Renal insufficiency  Psoriasis  Osteoarthritis  Nephrolithiasis  Lumbar disc disease  Gouty arthritis  Essential hypertension  Allergic rhinitis  S/P lumbar fusion  History of dental surgery      MedsMEDICATIONS  (STANDING):  acetaminophen   Tablet .. 975 milliGRAM(s) Oral every 8 hours  amLODIPine   Tablet 5 milliGRAM(s) Oral daily  bisacodyl 5 milliGRAM(s) Oral every 12 hours  chlorhexidine 0.12% Liquid 15 milliLiter(s) Swish and Spit two times a day  docusate sodium 100 milliGRAM(s) Oral three times a day  febuxostat 40 milliGRAM(s) Oral daily  gabapentin 600 milliGRAM(s) Oral at bedtime  gabapentin 100 milliGRAM(s) Oral daily  losartan 100 milliGRAM(s) Oral daily  melatonin 3 milliGRAM(s) Oral at bedtime  pantoprazole    Tablet 40 milliGRAM(s) Oral before breakfast  polyethylene glycol 3350 17 Gram(s) Oral daily  senna 2 Tablet(s) Oral at bedtime  sertraline 25 milliGRAM(s) Oral daily  tamsulosin 0.4 milliGRAM(s) Oral at bedtime    MEDICATIONS  (PRN):  diazepam    Tablet 2 milliGRAM(s) Oral every 8 hours PRN spasm  FIRST- Mouthwash  BLM 15 milliLiter(s) Swish and Spit two times a day PRN Mouth Care  magnesium hydroxide Suspension 30 milliLiter(s) Oral daily PRN Constipation  oxyCODONE    IR 5 milliGRAM(s) Oral every 4 hours PRN Moderate Pain (4 - 6)  oxyCODONE    IR 10 milliGRAM(s) Oral every 4 hours PRN Severe Pain (7 - 10)      Vital Signs Last 24 Hrs  T(C): 36.8 (16 Sep 2018 09:46), Max: 36.8 (16 Sep 2018 09:46)  T(F): 98.2 (16 Sep 2018 09:46), Max: 98.2 (16 Sep 2018 09:46)  HR: 80 (16 Sep 2018 09:46) (75 - 80)  BP: 124/77 (16 Sep 2018 09:46) (111/70 - 124/77)  BP(mean): --  RR: 15 (16 Sep 2018 09:46) (14 - 15)  SpO2: 95% (16 Sep 2018 09:46) (95% - 97%)  I&O's Summary    15 Sep 2018 07:01  -  16 Sep 2018 07:00  --------------------------------------------------------  IN: 0 mL / OUT: 2325 mL / NET: -2325 mL        PHYSICAL EXAM:  GENERAL: NAD  NECK: Supple  NERVOUS SYSTEM:  awake and alert  HEART: S1s2 NL , RRR  CHEST/LUNG: Clear to percussion bilaterally  ABDOMEN: Soft, Nontender, Nondistended; Bowel sounds present  EXTREMITIES:  No edema      LABS:  |09-15-18 @ 06:20            13.2  8.6>--------------<135<L>            37.9<L>      141  |  105  |  21  --------------------------<129<H>  4.1  |  8.5  |  1.30    Mg 1.8 / Phos3.7    PT -- / INR --    PTT --    Lipase --  09-15-18 @ 06:20    CAPILLARY BLOOD GLUCOSE      Imaging Personally Reviewed:  [ ] YES  [ ] NO      HEALTH ISSUES - PROBLEM Dx:  s/p L4-5 laminectomy and decompression with transforaminal lumbar interbody fusion  PT/OT per rehab  Pain control    Constipation  colace, senna, ducolax, lactulose, miralax, MOM    HTN  Cont norvasc/Losartan    Urinary Retension  per Uro    Care Discussed with Consultants/Other Providers [ x] YES  [ ] NO

## 2018-09-17 DIAGNOSIS — K59.03 DRUG INDUCED CONSTIPATION: ICD-10-CM

## 2018-09-17 PROCEDURE — 99232 SBSQ HOSP IP/OBS MODERATE 35: CPT | Mod: GC

## 2018-09-17 RX ORDER — LOSARTAN POTASSIUM 100 MG/1
50 TABLET, FILM COATED ORAL DAILY
Qty: 0 | Refills: 0 | Status: DISCONTINUED | OUTPATIENT
Start: 2018-09-17 | End: 2018-09-18

## 2018-09-17 RX ORDER — GABAPENTIN 400 MG/1
300 CAPSULE ORAL DAILY
Qty: 0 | Refills: 0 | Status: DISCONTINUED | OUTPATIENT
Start: 2018-09-17 | End: 2018-09-20

## 2018-09-17 RX ORDER — FLUOCINONIDE/EMOLLIENT BASE 0.05 %
1 CREAM (GRAM) TOPICAL EVERY 12 HOURS
Qty: 0 | Refills: 0 | Status: DISCONTINUED | OUTPATIENT
Start: 2018-09-17 | End: 2018-09-27

## 2018-09-17 RX ADMIN — Medication 975 MILLIGRAM(S): at 13:20

## 2018-09-17 RX ADMIN — PANTOPRAZOLE SODIUM 40 MILLIGRAM(S): 20 TABLET, DELAYED RELEASE ORAL at 06:05

## 2018-09-17 RX ADMIN — CHLORHEXIDINE GLUCONATE 15 MILLILITER(S): 213 SOLUTION TOPICAL at 06:06

## 2018-09-17 RX ADMIN — Medication 975 MILLIGRAM(S): at 22:22

## 2018-09-17 RX ADMIN — Medication 975 MILLIGRAM(S): at 21:52

## 2018-09-17 RX ADMIN — OXYCODONE HYDROCHLORIDE 10 MILLIGRAM(S): 5 TABLET ORAL at 08:57

## 2018-09-17 RX ADMIN — SERTRALINE 25 MILLIGRAM(S): 25 TABLET, FILM COATED ORAL at 12:31

## 2018-09-17 RX ADMIN — Medication 975 MILLIGRAM(S): at 15:32

## 2018-09-17 RX ADMIN — Medication 3 MILLIGRAM(S): at 21:55

## 2018-09-17 RX ADMIN — Medication 100 MILLIGRAM(S): at 21:54

## 2018-09-17 RX ADMIN — OXYCODONE HYDROCHLORIDE 10 MILLIGRAM(S): 5 TABLET ORAL at 11:27

## 2018-09-17 RX ADMIN — LUBIPROSTONE 24 MICROGRAM(S): 24 CAPSULE, GELATIN COATED ORAL at 17:05

## 2018-09-17 RX ADMIN — FEBUXOSTAT 40 MILLIGRAM(S): 40 TABLET ORAL at 12:32

## 2018-09-17 RX ADMIN — CHLORHEXIDINE GLUCONATE 15 MILLILITER(S): 213 SOLUTION TOPICAL at 17:05

## 2018-09-17 RX ADMIN — LUBIPROSTONE 24 MICROGRAM(S): 24 CAPSULE, GELATIN COATED ORAL at 05:58

## 2018-09-17 RX ADMIN — LOSARTAN POTASSIUM 50 MILLIGRAM(S): 100 TABLET, FILM COATED ORAL at 17:05

## 2018-09-17 RX ADMIN — Medication 975 MILLIGRAM(S): at 07:04

## 2018-09-17 RX ADMIN — SENNA PLUS 2 TABLET(S): 8.6 TABLET ORAL at 21:53

## 2018-09-17 RX ADMIN — Medication 975 MILLIGRAM(S): at 05:57

## 2018-09-17 RX ADMIN — GABAPENTIN 300 MILLIGRAM(S): 400 CAPSULE ORAL at 13:17

## 2018-09-17 RX ADMIN — Medication 100 MILLIGRAM(S): at 13:17

## 2018-09-17 RX ADMIN — OXYCODONE HYDROCHLORIDE 10 MILLIGRAM(S): 5 TABLET ORAL at 15:00

## 2018-09-17 RX ADMIN — TAMSULOSIN HYDROCHLORIDE 0.4 MILLIGRAM(S): 0.4 CAPSULE ORAL at 21:53

## 2018-09-17 RX ADMIN — GABAPENTIN 600 MILLIGRAM(S): 400 CAPSULE ORAL at 21:54

## 2018-09-17 NOTE — PROGRESS NOTE ADULT - SUBJECTIVE AND OBJECTIVE BOX
This is a 64 y/o male with h/o HTN, gouty arthritis, lumbar disc disease, OA with L4-L5 spondylolisthesis with stenosis and radiculopathy s/p L4-5 laminectomy and decompression with transforaminal lumbar interbody fusion and placement of interbody cage and posterior spinal fusion L4-5 by Dr Orlando Neville on 9/10/18 at [facility].     Siegel inserted due to retention, difficult cath.  Continues to have constipation, no improvement with stool softeners, dulcolax, multiple enemas.   Manual disimpaction attempted with stool in rectal vault but to soft to remove      RECENT LABS/IMAGING  CBC Full  -  ( 15 Sep 2018 06:20 )  WBC Count : 8.6 K/uL  Hemoglobin : 13.2 g/dL  Hematocrit : 37.9 %  Platelet Count - Automated : 135 K/uL  Mean Cell Volume : 91.9 fl  Mean Cell Hemoglobin : 32.1 pg  Mean Cell Hemoglobin Concentration : 34.9 gm/dL  Auto Neutrophil # : 6.1 K/uL  Auto Lymphocyte # : 1.5 K/uL  Auto Monocyte # : 0.8 K/uL  Auto Eosinophil # : 0.1 K/uL  Auto Basophil # : 0.1 K/uL  Auto Neutrophil % : 70.3 %  Auto Lymphocyte % : 17.5 %  Auto Monocyte % : 9.8 %  Auto Eosinophil % : 1.6 %  Auto Basophil % : 0.8 %    09-15    141  |  105  |  21  ----------------------------<  129<H>  4.1   |  28  |  1.30    Ca    8.5      15 Sep 2018 06:20  Phos  3.7     09-15  Mg     1.8     09-15    TPro  6.3  /  Alb  2.7<L>  /  TBili  0.9  /  DBili  x   /  AST  27  /  ALT  26  /  AlkPhos  95  09-15        VITALS  T(C): 36.8 (09-16-18 @ 09:46), Max: 36.8 (09-16-18 @ 09:46)  HR: 80 (09-16-18 @ 09:46) (75 - 80)  BP: 124/77 (09-16-18 @ 09:46) (111/70 - 124/77)  RR: 15 (09-16-18 @ 09:46) (14 - 15)  SpO2: 95% (09-16-18 @ 09:46) (95% - 97%)  Wt(kg): --    MEDICATIONS   acetaminophen   Tablet .. 975 milliGRAM(s) every 8 hours  amLODIPine   Tablet 5 milliGRAM(s) daily  bisacodyl 5 milliGRAM(s) every 12 hours  chlorhexidine 0.12% Liquid 15 milliLiter(s) two times a day  diazepam    Tablet 2 milliGRAM(s) every 8 hours PRN  docusate sodium 100 milliGRAM(s) three times a day  febuxostat 40 milliGRAM(s) daily  FIRST- Mouthwash  BLM 15 milliLiter(s) two times a day PRN  gabapentin 600 milliGRAM(s) at bedtime  gabapentin 100 milliGRAM(s) daily  lactulose Syrup 20 Gram(s) once  losartan 100 milliGRAM(s) daily  magnesium hydroxide Suspension 30 milliLiter(s) daily PRN  melatonin 3 milliGRAM(s) at bedtime  oxyCODONE    IR 5 milliGRAM(s) every 4 hours PRN  oxyCODONE    IR 10 milliGRAM(s) every 4 hours PRN  pantoprazole    Tablet 40 milliGRAM(s) before breakfast  polyethylene glycol 3350 17 Gram(s) daily PRN  senna 2 Tablet(s) at bedtime  sertraline 25 milliGRAM(s) daily  tamsulosin 0.4 milliGRAM(s) at bedtime      	General: NAD, Resting Comfortable,                                  	HEENT: NC/AT, EOM I, PERRLA, Normal Conjunctivae  	Cardio: RRR, Normal S1-S2, No M/G/R                              	Pulm: No Respiratory Distress,  Lungs CTAB                        	Abdomen: Distended, slight tenderness.                                            	MSK: No joint swelling, Full ROM of ext                                          	Ext: No C/C/E, Pulses 2+ throughout, No calf tenderness    	Skin:  all skin intact                                                                 	Wounds: Surgical 3 - tegederm and gauze all look CDI   	Decubitus Ulcers: None Present     	Neurological Examination    	Cognitive: AAO x 3                                                                                                                                                  	CN II - XII  intact                                                                      	Sensory: Intact to light touch                                                                                               	Tone: normal Throughout     	Motor    	LEFT    UE: SF [5/5], EF [5/5], EE [5/5], WE [5/5],  [wnl]  	RIGHT UE: SF [5/5], EF [5/5], EE [5/5], WE [5/5],  [wnl]  	LEFT    LE:  HF [5/5], KE [5/5], DF [5/5], EHL [5/5],  PF [5/5]  	RIGHT LE:  HF [5/5], KE [5/5], DF [5/5], EHL [5/5],  PF [5/5]      	Reflex:  2 + , Albert/Babinski negative     	Mood/Affect: wnl  -------------------------------------------------------------------- This is a 64 y/o male with h/o HTN, gouty arthritis, lumbar disc disease, OA with L4-L5 spondylolisthesis with stenosis and radiculopathy s/p L4-5 laminectomy and decompression with transforaminal lumbar interbody fusion and placement of interbody cage and posterior spinal fusion L4-5 by Dr Orlando Neville on 9/10/18 at [facility].       TODAYs SUBJECTIVE  Patient seen and examined in the AM. He failed manual disempaction yesterday. patient however was able to have 3 bowelmovement since then with start of amitiza.  He endorses some "nerve" like pain on the left leg. Denies pain on the back at the sight of surgery.      [X] Constiutional WNL            [X] Cardio WNL               [X] Resp WNL  [ ] GI WNL                            [X]  WNL                    [X] Heme WNL  [X] Endo WNL                       [ ] Skin WNL                   [ ] MSK WNL  [ ] Neuro WNL                     [X] Cognitive WNL            [X] Psych WNL    Physical:  Vital Signs Last 24 Hrs  T(C): 36.3 (17 Sep 2018 08:41), Max: 37.4 (16 Sep 2018 21:45)  T(F): 97.4 (17 Sep 2018 08:41), Max: 99.4 (16 Sep 2018 21:45)  HR: 84 (17 Sep 2018 08:41) (84 - 91)  BP: 94/60 (17 Sep 2018 08:41) (94/60 - 116/73)  BP(mean): --  RR: 14 (17 Sep 2018 08:41) (14 - 14)  SpO2: 95% (17 Sep 2018 08:41) (94% - 97%)      General: NAD, Resting Comfortable,                                  HEENT: NC/AT, EOM I, PERRLA, Normal Conjunctivae  Cardio: RRR, Normal S1-S2, No M/G/R                              Pulm: No Respiratory Distress,  Lungs CTAB                        Abdomen: no tenderness to palpation,                                           MSK: No joint swelling                                        Ext: No C/C/E, Pulses 2+ throughout, No calf tenderness    Wounds: Surgical incision on the lower back x 2 covered with tegaderm and telfa. on the left appears intact, on the right with slight blood tinge on gauze as well as ?serous drainage  	Decubitus Ulcers: None Present     MEDICATIONS  (STANDING):  acetaminophen   Tablet .. 975 milliGRAM(s) Oral every 8 hours  amLODIPine   Tablet 5 milliGRAM(s) Oral daily  chlorhexidine 0.12% Liquid 15 milliLiter(s) Swish and Spit two times a day  docusate sodium 100 milliGRAM(s) Oral three times a day  febuxostat 40 milliGRAM(s) Oral daily  gabapentin 600 milliGRAM(s) Oral at bedtime  gabapentin 300 milliGRAM(s) Oral daily  losartan 100 milliGRAM(s) Oral daily  lubiprostone 24 MICROGram(s) Oral two times a day  melatonin 3 milliGRAM(s) Oral at bedtime  pantoprazole    Tablet 40 milliGRAM(s) Oral before breakfast  senna 2 Tablet(s) Oral at bedtime  sertraline 25 milliGRAM(s) Oral daily  tamsulosin 0.4 milliGRAM(s) Oral at bedtime    MEDICATIONS  (PRN):  diazepam    Tablet 2 milliGRAM(s) Oral every 8 hours PRN spasm  FIRST- Mouthwash  BLM 15 milliLiter(s) Swish and Spit two times a day PRN Mouth Care  magnesium hydroxide Suspension 30 milliLiter(s) Oral daily PRN Constipation  oxyCODONE    IR 5 milliGRAM(s) Oral every 4 hours PRN Moderate Pain (4 - 6)  oxyCODONE    IR 10 milliGRAM(s) Oral every 4 hours PRN Severe Pain (7 - 10) This is a 62 y/o male with h/o HTN, gouty arthritis, lumbar disc disease, OA with L4-L5 spondylolisthesis with stenosis and radiculopathy s/p L4-5 laminectomy and decompression with transforaminal lumbar interbody fusion and placement of interbody cage and posterior spinal fusion L4-5 by Dr Orlando Neville on 9/10/18 at [facility].       TODAYs SUBJECTIVE  Patient seen and examined in the AM. He failed manual disempaction yesterday. patient however was able to have 3 bowelmovement since then with start of amitiza.  He endorses some "nerve" like pain on the left leg. Denies pain on the back at the sight of surgery.      [X] Constiutional WNL            [X] Cardio WNL               [X] Resp WNL  [ ] GI WNL                            [X]  WNL                    [X] Heme WNL  [X] Endo WNL                       [ ] Skin WNL                   [ ] MSK WNL  [ ] Neuro WNL                     [X] Cognitive WNL            [X] Psych WNL    Physical:  Vital Signs Last 24 Hrs  T(C): 36.3 (17 Sep 2018 08:41), Max: 37.4 (16 Sep 2018 21:45)  T(F): 97.4 (17 Sep 2018 08:41), Max: 99.4 (16 Sep 2018 21:45)  HR: 84 (17 Sep 2018 08:41) (84 - 91)  BP: 94/60 (17 Sep 2018 08:41) (94/60 - 116/73)  BP(mean): --  RR: 14 (17 Sep 2018 08:41) (14 - 14)  SpO2: 95% (17 Sep 2018 08:41) (94% - 97%)      General: NAD, Resting Comfortable,                                  HEENT: NC/AT, EOM I, PERRLA, Normal Conjunctivae  Cardio: RRR, Normal S1-S2, No M/G/R                              Pulm: No Respiratory Distress,  Lungs CTAB                        Abdomen: no tenderness to palpation,                                           MSK: No joint swelling                                        Ext: No C/C/E, Pulses 2+ throughout, No calf tenderness    Wounds: Surgical incision on the lower back x 2 covered with tegaderm and telfa. on the left appears intact, on the right with slight blood tinge on gauze as well as ?serous drainage  	Decubitus Ulcers: None Present     MEDICATIONS  (STANDING):  acetaminophen   Tablet .. 975 milliGRAM(s) Oral every 8 hours  amLODIPine   Tablet 5 milliGRAM(s) Oral daily  chlorhexidine 0.12% Liquid 15 milliLiter(s) Swish and Spit two times a day  docusate sodium 100 milliGRAM(s) Oral three times a day  febuxostat 40 milliGRAM(s) Oral daily  gabapentin 600 milliGRAM(s) Oral at bedtime  gabapentin 300 milliGRAM(s) Oral daily  losartan 100 milliGRAM(s) Oral daily  lubiprostone 24 MICROGram(s) Oral two times a day  melatonin 3 milliGRAM(s) Oral at bedtime  pantoprazole    Tablet 40 milliGRAM(s) Oral before breakfast  senna 2 Tablet(s) Oral at bedtime  sertraline 25 milliGRAM(s) Oral daily  tamsulosin 0.4 milliGRAM(s) Oral at bedtime    MEDICATIONS  (PRN):  diazepam    Tablet 2 milliGRAM(s) Oral every 8 hours PRN spasm  FIRST- Mouthwash  BLM 15 milliLiter(s) Swish and Spit two times a day PRN Mouth Care  magnesium hydroxide Suspension 30 milliLiter(s) Oral daily PRN Constipation  oxyCODONE    IR 5 milliGRAM(s) Oral every 4 hours PRN Moderate Pain (4 - 6)  oxyCODONE    IR 10 milliGRAM(s) Oral every 4 hours PRN Severe Pain (7 - 10)    Assessment/Plan    This is a 62 y/o male with h/o HTN, gouty arthritis, lumbar disc disease, OA with L4-L5 spondylolisthesis with stenosis and radiculopathy s/p L4-5 laminectomy and decompression with transforaminal lumbar interbody fusion and placement of interbody cage and posterior spinal fusion L4-5 with functional deficits including gait and ADL impairments with decreased endurance and strength.     COMORBIDITES/ACTIVE MEDICAL ISSUES     Gait Instability, ADL impairments and Functional impairments 2/2 recent Lami: Comprehensive Rehab Program of PT/OT     #Neuro/MSK:  -Pain control: Tylenol , Oxycodone PRN, gabapentin, valium    #Pulm:  -No active Issues at this time    #Cardio:  -continue norvasc, Losartan slight hypotension today, encouraged to drink more fluids. <1% Ae of amitiza for hypotension, consider decreasing losartan     GI/Bowel Mgmt -  abdominal x-ray wnl. stareted on amtiza yesterday with improvement in constipation (had 3 since yesterday). Apprec GI recs. Continue MOM    #Renal/Lytes:  -Bladder management: indwelling catheter in place with flomax. TOV tomorrow    #Endo:  -No active Issues at this time    #ID:  -No active issues at this time    #Heme/Onc:  - DVT: CX due to spine surgery-  SCDs, TEDs     #Skin:  -monitor surgical incision fro drainage    Precautions / PROPHYLAXIS:   - Falls, Spinal  - ortho: Weight bearing status: WBAT   - Lungs: Aspiration, Incentive Spirometer  - Pressure injury/Skin: Turn Q2hrs while in bed, OOB to Chair, PT/OT

## 2018-09-17 NOTE — PROGRESS NOTE ADULT - ATTENDING COMMENTS
Chart reviewed. Patient seen at bedside. Noted to have flare up of psoriatic lesions in LE. Patient off his usual medication Restart local cream - steroid cream    2. Back incision : sutures +, areas of blisters are dry. However a piece of gauze entangled in suture and skin. Will discuss with surgeon    3. Constipation resolved. Continue current medication regimen    4. : TOV in am Chart reviewed. Patient seen at bedside. Noted to have flare up of psoriatic lesions in LE. Patient off his usual medication Restart local cream - steroid cream    2. Back incision : sutures +, areas of blisters are dry. However a piece of gauze entangled in suture and skin. New dressing applied. Will discuss with surgeon.     3. Constipation resolved. Continue current medication regimen    4. : TOV in am Chart reviewed. Patient seen at bedside. Noted to have flare up of psoriatic lesions in LE. Patient off his usual medication Restart local cream - steroid cream    2. Back incision : sutures +, areas of blisters are dry. However a piece of gauze entangled in suture and skin. New dressing applied. Will discuss with surgeon.     3. Constipation resolved. Continue current medication regimen    4. : TOV in am    5. BP low this am and meds held. will reduce losartan to 50mg daily and monitor

## 2018-09-17 NOTE — PROGRESS NOTE ADULT - SUBJECTIVE AND OBJECTIVE BOX
Patient feeling much better today.  He had multiple bowel movements since started on Amitiza yesterday.  No abdominal pain.  Tolerating PO without difficulty.    MEDICATIONS  (STANDING):  acetaminophen   Tablet .. 975 milliGRAM(s) Oral every 8 hours  amLODIPine   Tablet 5 milliGRAM(s) Oral daily  chlorhexidine 0.12% Liquid 15 milliLiter(s) Swish and Spit two times a day  docusate sodium 100 milliGRAM(s) Oral three times a day  febuxostat 40 milliGRAM(s) Oral daily  fluocinonide 0.05% Cream 1 Application(s) Topical every 12 hours  gabapentin 300 milliGRAM(s) Oral daily  gabapentin 600 milliGRAM(s) Oral at bedtime  losartan 50 milliGRAM(s) Oral daily  lubiprostone 24 MICROGram(s) Oral two times a day  melatonin 3 milliGRAM(s) Oral at bedtime  pantoprazole    Tablet 40 milliGRAM(s) Oral before breakfast  senna 2 Tablet(s) Oral at bedtime  sertraline 25 milliGRAM(s) Oral daily  tamsulosin 0.4 milliGRAM(s) Oral at bedtime    MEDICATIONS  (PRN):  diazepam    Tablet 2 milliGRAM(s) Oral every 8 hours PRN spasm  FIRST- Mouthwash  BLM 15 milliLiter(s) Swish and Spit two times a day PRN Mouth Care  magnesium hydroxide Suspension 30 milliLiter(s) Oral daily PRN Constipation  oxyCODONE    IR 5 milliGRAM(s) Oral every 4 hours PRN Moderate Pain (4 - 6)  oxyCODONE    IR 10 milliGRAM(s) Oral every 4 hours PRN Severe Pain (7 - 10)    No Known Allergies      Vital Signs Last 24 Hrs  T(C): 36.4 (17 Sep 2018 20:14), Max: 37.4 (16 Sep 2018 21:45)  T(F): 97.5 (17 Sep 2018 20:14), Max: 99.4 (16 Sep 2018 21:45)  HR: 73 (17 Sep 2018 20:14) (69 - 91)  BP: 112/70 (17 Sep 2018 20:14) (94/60 - 116/73)  BP(mean): --  RR: 14 (17 Sep 2018 20:14) (14 - 14)  SpO2: 95% (17 Sep 2018 20:14) (94% - 97%)    PHYSICAL EXAM:    Constitutional: NAD, well-developed  Neck: No LAD, supple  Respiratory: clear to auscultation b/l no rales, rhonchi, wheezing  Cardiovascular: S1 and S2, RRR, no murmur  Gastrointestinal: +BS x4, soft, NT/ND, neg HSM,  Extremities: No peripheral edema, neg clubbing, cyanosis  Vascular: 2+ peripheral pulses

## 2018-09-18 DIAGNOSIS — K04.7 PERIAPICAL ABSCESS WITHOUT SINUS: ICD-10-CM

## 2018-09-18 LAB
HCT VFR BLD CALC: 38 % — LOW (ref 39–50)
HGB BLD-MCNC: 12.7 G/DL — LOW (ref 13–17)
MCHC RBC-ENTMCNC: 30.6 PG — SIGNIFICANT CHANGE UP (ref 27–34)
MCHC RBC-ENTMCNC: 33.3 GM/DL — SIGNIFICANT CHANGE UP (ref 32–36)
MCV RBC AUTO: 91.7 FL — SIGNIFICANT CHANGE UP (ref 80–100)
PLATELET # BLD AUTO: 147 K/UL — LOW (ref 150–400)
RBC # BLD: 4.14 M/UL — LOW (ref 4.2–5.8)
RBC # FLD: 11.5 % — SIGNIFICANT CHANGE UP (ref 10.3–14.5)
WBC # BLD: 8.1 K/UL — SIGNIFICANT CHANGE UP (ref 3.8–10.5)
WBC # FLD AUTO: 8.1 K/UL — SIGNIFICANT CHANGE UP (ref 3.8–10.5)

## 2018-09-18 PROCEDURE — 99233 SBSQ HOSP IP/OBS HIGH 50: CPT

## 2018-09-18 PROCEDURE — 99232 SBSQ HOSP IP/OBS MODERATE 35: CPT

## 2018-09-18 RX ORDER — HEPARIN SODIUM 5000 [USP'U]/ML
5000 INJECTION INTRAVENOUS; SUBCUTANEOUS EVERY 8 HOURS
Qty: 0 | Refills: 0 | Status: DISCONTINUED | OUTPATIENT
Start: 2018-09-19 | End: 2018-09-27

## 2018-09-18 RX ORDER — ACETAMINOPHEN 500 MG
975 TABLET ORAL EVERY 6 HOURS
Qty: 0 | Refills: 0 | Status: DISCONTINUED | OUTPATIENT
Start: 2018-09-18 | End: 2018-09-27

## 2018-09-18 RX ORDER — SACCHAROMYCES BOULARDII 250 MG
250 POWDER IN PACKET (EA) ORAL
Qty: 0 | Refills: 0 | Status: DISCONTINUED | OUTPATIENT
Start: 2018-09-18 | End: 2018-09-27

## 2018-09-18 RX ORDER — CIPROFLOXACIN LACTATE 400MG/40ML
250 VIAL (ML) INTRAVENOUS ONCE
Qty: 0 | Refills: 0 | Status: COMPLETED | OUTPATIENT
Start: 2018-09-18 | End: 2018-09-18

## 2018-09-18 RX ADMIN — Medication 1 APPLICATION(S): at 17:57

## 2018-09-18 RX ADMIN — SENNA PLUS 2 TABLET(S): 8.6 TABLET ORAL at 22:15

## 2018-09-18 RX ADMIN — Medication 100 MILLIGRAM(S): at 06:12

## 2018-09-18 RX ADMIN — LUBIPROSTONE 24 MICROGRAM(S): 24 CAPSULE, GELATIN COATED ORAL at 17:56

## 2018-09-18 RX ADMIN — OXYCODONE HYDROCHLORIDE 10 MILLIGRAM(S): 5 TABLET ORAL at 14:08

## 2018-09-18 RX ADMIN — Medication 1 APPLICATION(S): at 06:13

## 2018-09-18 RX ADMIN — Medication 975 MILLIGRAM(S): at 06:42

## 2018-09-18 RX ADMIN — CHLORHEXIDINE GLUCONATE 15 MILLILITER(S): 213 SOLUTION TOPICAL at 06:11

## 2018-09-18 RX ADMIN — Medication 3 MILLIGRAM(S): at 22:15

## 2018-09-18 RX ADMIN — Medication 100 MILLIGRAM(S): at 22:15

## 2018-09-18 RX ADMIN — FEBUXOSTAT 40 MILLIGRAM(S): 40 TABLET ORAL at 13:18

## 2018-09-18 RX ADMIN — TAMSULOSIN HYDROCHLORIDE 0.4 MILLIGRAM(S): 0.4 CAPSULE ORAL at 22:15

## 2018-09-18 RX ADMIN — Medication 100 MILLIGRAM(S): at 13:18

## 2018-09-18 RX ADMIN — PANTOPRAZOLE SODIUM 40 MILLIGRAM(S): 20 TABLET, DELAYED RELEASE ORAL at 06:12

## 2018-09-18 RX ADMIN — GABAPENTIN 300 MILLIGRAM(S): 400 CAPSULE ORAL at 13:18

## 2018-09-18 RX ADMIN — Medication 250 MILLIGRAM(S): at 22:15

## 2018-09-18 RX ADMIN — OXYCODONE HYDROCHLORIDE 10 MILLIGRAM(S): 5 TABLET ORAL at 13:19

## 2018-09-18 RX ADMIN — GABAPENTIN 600 MILLIGRAM(S): 400 CAPSULE ORAL at 22:15

## 2018-09-18 RX ADMIN — Medication 150 MILLIGRAM(S): at 17:56

## 2018-09-18 RX ADMIN — Medication 250 MILLIGRAM(S): at 17:56

## 2018-09-18 RX ADMIN — Medication 975 MILLIGRAM(S): at 06:12

## 2018-09-18 RX ADMIN — SERTRALINE 25 MILLIGRAM(S): 25 TABLET, FILM COATED ORAL at 13:18

## 2018-09-18 RX ADMIN — LUBIPROSTONE 24 MICROGRAM(S): 24 CAPSULE, GELATIN COATED ORAL at 06:11

## 2018-09-18 NOTE — PROGRESS NOTE ADULT - SUBJECTIVE AND OBJECTIVE BOX
This is a 62 y/o male with h/o HTN, gouty arthritis, lumbar disc disease, OA with L4-L5 spondylolisthesis with stenosis and radiculopathy s/p L4-5 laminectomy and decompression with transforaminal lumbar interbody fusion and placement of interbody cage and posterior spinal fusion L4-5 by Dr Orlando Neville on 9/10/18 at [facility].     Subjective  c/o left sided odontalgia. Denies fever chills NVD, occasional constipation.         PAST MEDICAL & SURGICAL HISTORY:  Spinal stenosis  Radiculopathy  Spondylolisthesis  Varicosities of leg  Cyst of left kidney  Renal insufficiency  Psoriasis  Osteoarthritis  Nephrolithiasis  Lumbar disc disease  Gouty arthritis  Essential hypertension  Allergic rhinitis  S/P lumbar fusion  History of dental surgery    Vital Signs Last 24 Hrs  T(C): 36.4 (18 Sep 2018 08:08), Max: 36.4 (17 Sep 2018 20:14)  T(F): 97.5 (18 Sep 2018 08:08), Max: 97.5 (17 Sep 2018 20:14)  HR: 88 (18 Sep 2018 08:08) (69 - 88)  BP: 98/59 (18 Sep 2018 08:08) (98/59 - 112/70)  BP(mean): --  RR: 14 (18 Sep 2018 08:08) (14 - 14)  SpO2: 96% (18 Sep 2018 08:08) (95% - 96%)  15 Sep 2018 07:01  -  16 Sep 2018 07:00  --------------------------------------------------------  IN: 0 mL / OUT: 2325 mL / NET: -2325 mL        PHYSICAL EXAM:  GENERAL: NAD  HEENT mmm no gross caries or abscess, no adenopathy  NECK: Supple  NERVOUS SYSTEM:  awake and alert  HEART: S1s2 NL , RRR  CHEST/LUNG: Clear to percussion bilaterally  ABDOMEN: Soft, Nontender, Nondistended; Bowel sounds present   multani with 100 cc yellow urine  EXTREMITIES:  No edema                          12.7   8.1   )-----------( 147      ( 18 Sep 2018 06:38 )             38.0               CAPILLARY BLOOD GLUCOSE                CAPILLARY BLOOD GLUCOSE      Imaging Personally Reviewed:  [ ] YES  [ ] NO      PROBLEM LIST  1. s/p L4-5 laminectomy and decompression with transforaminal lumbar interbody fusion  PT/OT per rehab    2.  dental abscess. Seen by Dr Germán DDS. Day 1 clinda. Reccomend probiotic  3Pain control    4Constipation  colace, senna, ducolax, lactulose, miralax, MOM    5HTN  Cont norvasc/Losartan    6Urinary Retention. Tiral of void tonight.   per Uro    Care Discussed with Consultants/Other Providers [ x] YES  [ ] NO

## 2018-09-18 NOTE — PROGRESS NOTE ADULT - SUBJECTIVE AND OBJECTIVE BOX
This is a 64 y/o male with h/o HTN, gouty arthritis, lumbar disc disease, OA with L4-L5 spondylolisthesis with stenosis and radiculopathy s/p L4-5 laminectomy and decompression with transforaminal lumbar interbody fusion and placement of interbody cage and posterior spinal fusion L4-5 by Dr Orlando Neville on 9/10/18 at [facility].       TODAYs SUBJECTIVE  Patient seen and examined in the AM. Feels much better this am. Denies any left radicular pain 2. Overnight had right sided tooth pain     [X] Constiutional WNL            [X] Cardio WNL               [X] Resp WNL  [ ] GI WNL                            [X]  WNL                    [X] Heme WNL  [X] Endo WNL                       [ ] Skin WNL                   [ ] MSK WNL  [ ] Neuro WNL                     [X] Cognitive WNL            [X] Psych WNL      Vital Signs Last 24 Hrs  T(C): 36.4 (18 Sep 2018 08:08), Max: 36.4 (17 Sep 2018 20:14)  T(F): 97.5 (18 Sep 2018 08:08), Max: 97.5 (17 Sep 2018 20:14)  HR: 88 (18 Sep 2018 08:08) (69 - 88)  BP: 98/59 (18 Sep 2018 08:08) (98/59 - 112/70)  BP(mean): --  RR: 14 (18 Sep 2018 08:08) (14 - 14)  SpO2: 96% (18 Sep 2018 08:08) (95% - 96%)      General: NAD, Resting Comfortable,   Oral cavity: right lower gum molar area - swelling noted, no tenderness                                 Cardio: S1-S2,                           Pulm: Lungs CTAB                        Abdomen: no tenderness to palpation,                                           MSK: No joint swelling                                        Ext: No C/C/E, Pulses 2+ throughout, No calf tenderness    Wounds: Surgical incision on the lower back x 2 covered with tegaderm and telfa. on the left appears intact, on the right with slight blood tinge on gauze as well as minimal serous drainage on dressing  	Decubitus Ulcers: None Present   Skin: LE with psoriatic lesions    MEDICATIONS  (STANDING):  acetaminophen   Tablet .. 975 milliGRAM(s) Oral every 8 hours  amLODIPine   Tablet 5 milliGRAM(s) Oral daily  chlorhexidine 0.12% Liquid 15 milliLiter(s) Swish and Spit two times a day  ciprofloxacin     Tablet 250 milliGRAM(s) Oral once  docusate sodium 100 milliGRAM(s) Oral three times a day  febuxostat 40 milliGRAM(s) Oral daily  fluocinonide 0.05% Cream 1 Application(s) Topical every 12 hours  gabapentin 300 milliGRAM(s) Oral daily  gabapentin 600 milliGRAM(s) Oral at bedtime  losartan 50 milliGRAM(s) Oral daily  lubiprostone 24 MICROGram(s) Oral two times a day  melatonin 3 milliGRAM(s) Oral at bedtime  pantoprazole    Tablet 40 milliGRAM(s) Oral before breakfast  senna 2 Tablet(s) Oral at bedtime  sertraline 25 milliGRAM(s) Oral daily  tamsulosin 0.4 milliGRAM(s) Oral at bedtime    MEDICATIONS  (PRN):  diazepam    Tablet 2 milliGRAM(s) Oral every 8 hours PRN spasm  FIRST- Mouthwash  BLM 15 milliLiter(s) Swish and Spit two times a day PRN Mouth Care  magnesium hydroxide Suspension 30 milliLiter(s) Oral daily PRN Constipation  oxyCODONE    IR 5 milliGRAM(s) Oral every 4 hours PRN Moderate Pain (4 - 6)  oxyCODONE    IR 10 milliGRAM(s) Oral every 4 hours PRN Severe Pain (7 - 10)        Assessment/Plan    This is a 64 y/o male with h/o HTN, gouty arthritis, lumbar disc disease, OA with L4-L5 spondylolisthesis with stenosis and radiculopathy s/p L4-5 laminectomy and decompression with transforaminal lumbar interbody fusion and placement of interbody cage and posterior spinal fusion L4-5 with functional deficits including gait and ADL impairments with decreased endurance and strength.     COMORBIDITES/ACTIVE MEDICAL ISSUES     Gait Instability, ADL impairments and Functional impairments 2/2 recent Lami: Comprehensive Rehab Program of PT/OT     #Neuro/MSK:  -Pain control: Tylenol , Oxycodone PRN, gabapentin, valium- d/c valium. change tylenol to prn     #Cardio:  -continue norvasc, Losartan - dose reduced 9/17 to 50mg     GI/Bowel Mgmt -  abdominal x-ray wnl. on amitiza    #Renal/Lytes: Urology fu noted  -Bladder management: indwelling catheter in place with flomax. TOV overnight with cipro dose per urology     #Heme/Onc:  - DVT: CX due to spine surgery-  SCDs, TEDs     #Skin:  -monitor surgical incision for drainage, Steroid cream for lesions    Dental Appointment to be placed    Precautions / PROPHYLAXIS:   - Falls, Spinal  - ortho: Weight bearing status: WBAT   - Lungs: Aspiration, Incentive Spirometer  - Pressure injury/Skin: Turn Q2hrs while in bed, OOB to Chair, PT/OT

## 2018-09-18 NOTE — CONSULT NOTE ADULT - SUBJECTIVE AND OBJECTIVE BOX
09-18-18 @ 13:22  63y Male seen in rehab complaining of swelling on the right side of face and discomfort on the lower right bridge.  He states that he has had this happen before and that he was told by his dentist   that he needs to have the bridge removed.  PMH: Fusion of spine  Spinal stenosis  Radiculopathy  Spondylolisthesis  Varicosities of leg  Cyst of left kidney  Renal insufficiency  Psoriasis  Osteoarthritis  Nephrolithiasis  Lumbar disc disease  Gouty arthritis  Essential hypertension  Allergic rhinitis  Constipation due to opioid therapy  Nephrolithiasis  Urinary retention  Essential hypertension  Renal insufficiency  Spondylolisthesis  S/P lumbar fusion  History of dental surgery    Meds: acetaminophen   Tablet .. 975 milliGRAM(s) Oral every 6 hours PRN  amLODIPine   Tablet 5 milliGRAM(s) Oral daily  ciprofloxacin     Tablet 250 milliGRAM(s) Oral once  clindamycin   Capsule 150 milliGRAM(s) Oral every 6 hours  diazepam    Tablet 2 milliGRAM(s) Oral every 8 hours PRN  docusate sodium 100 milliGRAM(s) Oral three times a day  febuxostat 40 milliGRAM(s) Oral daily  FIRST- Mouthwash  BLM 15 milliLiter(s) Swish and Spit two times a day PRN  fluocinonide 0.05% Cream 1 Application(s) Topical every 12 hours  gabapentin 300 milliGRAM(s) Oral daily  gabapentin 600 milliGRAM(s) Oral at bedtime  losartan 50 milliGRAM(s) Oral daily  lubiprostone 24 MICROGram(s) Oral two times a day  magnesium hydroxide Suspension 30 milliLiter(s) Oral daily PRN  melatonin 3 milliGRAM(s) Oral at bedtime  oxyCODONE    IR 5 milliGRAM(s) Oral every 4 hours PRN  oxyCODONE    IR 10 milliGRAM(s) Oral every 4 hours PRN  pantoprazole    Tablet 40 milliGRAM(s) Oral before breakfast  senna 2 Tablet(s) Oral at bedtime  sertraline 25 milliGRAM(s) Oral daily  tamsulosin 0.4 milliGRAM(s) Oral at bedtime    Allergies: patient states that he recalls having a reaction (rash) after taking amoxicillin  Exam  Extraoral: Moderate swelling right side of mandible  Intraoral: No intraoral swelling, no gingival inflammation.  Patient has tenderness to percussion the lower right bridge from lower right second premolar to lower right second molar (29-31).

## 2018-09-18 NOTE — CONSULT NOTE ADULT - PROBLEM SELECTOR RECOMMENDATION 9
Due to the possibility of an allergy to penicillin, recommend Clindamycin 150mg, 28 tablets, one tablet every six hours.  Warm saline rinses. Patient should follow-up with his private dentist upon discharge for further evaluation.

## 2018-09-18 NOTE — PROGRESS NOTE ADULT - SUBJECTIVE AND OBJECTIVE BOX
Patient is a 63y old  Male who presents with a chief complaint of s/p transforaminal, L4-5, lumbar interbody fusion (17 Sep 2018 21:34)    HPI:  This is a 64 y/o male with h/o HTN, gouty arthritis, lumbar disc disease, OA with L4-L5 spondylolisthesis with stenosis and radiculopathy s/p L4-5 laminectomy and decompression with transforaminal lumbar interbody fusion and placement of interbody cage and posterior spinal fusion L4-5 by Dr Orlando Neville on 9/10/18 at [facility].     Patient to continue use LSO and is full WBAT. See by physical and ocupational therapists. Pt is ambulating 30 ft with RW and min to mod A, min A for UE dressing, max A for LE dressing,     urinary retention. constipation improved                Interval Events:  Patient seen and examined at bedside.    MEDICATIONS:  MEDICATIONS  (STANDING):  acetaminophen   Tablet .. 975 milliGRAM(s) Oral every 8 hours  amLODIPine   Tablet 5 milliGRAM(s) Oral daily  chlorhexidine 0.12% Liquid 15 milliLiter(s) Swish and Spit two times a day  ciprofloxacin     Tablet 250 milliGRAM(s) Oral once  docusate sodium 100 milliGRAM(s) Oral three times a day  febuxostat 40 milliGRAM(s) Oral daily  fluocinonide 0.05% Cream 1 Application(s) Topical every 12 hours  gabapentin 300 milliGRAM(s) Oral daily  gabapentin 600 milliGRAM(s) Oral at bedtime  losartan 50 milliGRAM(s) Oral daily  lubiprostone 24 MICROGram(s) Oral two times a day  melatonin 3 milliGRAM(s) Oral at bedtime  pantoprazole    Tablet 40 milliGRAM(s) Oral before breakfast  senna 2 Tablet(s) Oral at bedtime  sertraline 25 milliGRAM(s) Oral daily  tamsulosin 0.4 milliGRAM(s) Oral at bedtime    MEDICATIONS  (PRN):  diazepam    Tablet 2 milliGRAM(s) Oral every 8 hours PRN spasm  FIRST- Mouthwash  BLM 15 milliLiter(s) Swish and Spit two times a day PRN Mouth Care  magnesium hydroxide Suspension 30 milliLiter(s) Oral daily PRN Constipation  oxyCODONE    IR 5 milliGRAM(s) Oral every 4 hours PRN Moderate Pain (4 - 6)  oxyCODONE    IR 10 milliGRAM(s) Oral every 4 hours PRN Severe Pain (7 - 10)      Allergies    No Known Allergies    Intolerances        T(C): 36.4 (18 @ 20:14), Max: 37.4 (18 @ 21:45)  T(F): 97.5 (18 @ 20:14), Max: 99.4 (18 @ 21:45)  HR: 81 (18 @ 06:09) (69 - 91)  BP: 100/63 (18 @ 06:09) (94/60 - 124/77)  RR: 14 (18 @ 06:09) (14 - 15)  SpO2: 95% (18 @ 06:09) (94% - 97%)          18 @ 07:01  -  18 @ 07:00  --------------------------------------------------------  IN:  Total IN: 0 mL    OUT:    Incontinent per Collection Ba mL  Total OUT: 952 mL    Total NET: -952 mL      18 @ 07:01  -  18 @ 07:00  --------------------------------------------------------  IN:  Total IN: 0 mL    OUT:    Incontinent per Collection Ba mL    Indwelling Catheter - Urethral: 1025 mL  Total OUT: 1150 mL    Total NET: -1150 mL      18 @ 07:01  -  18 @ 08:10  --------------------------------------------------------  IN:  Total IN: 0 mL    OUT:    Indwelling Catheter - Urethral: 450 mL  Total OUT: 450 mL    Total NET: -450 mL          LABS:      CBC Full  -  ( 18 Sep 2018 06:38 )  WBC Count : 8.1 K/uL  Hemoglobin : 12.7 g/dL  Hematocrit : 38.0 %  Platelet Count - Automated : 147 K/uL  Mean Cell Volume : 91.7 fl  Mean Cell Hemoglobin : 30.6 pg  Mean Cell Hemoglobin Concentration : 33.3 gm/dL  Auto Neutrophil # : x  Auto Lymphocyte # : x  Auto Monocyte # : x  Auto Eosinophil # : x  Auto Basophil # : x  Auto Neutrophil % : x  Auto Lymphocyte % : x  Auto Monocyte % : x  Auto Eosinophil % : x  Auto Basophil % : x                        Physical Exam    Constitutional: alert, no acute distress    Abdomen: soft, nontender, nondistended, no HSM    Genitourinary: no bladder distention, multani yellow

## 2018-09-18 NOTE — CONSULT NOTE ADULT - REASON FOR ADMISSION
s/p transforaminal, L4-5, lumbar interbody fusion

## 2018-09-19 LAB
HCT VFR BLD CALC: 38.2 % — LOW (ref 39–50)
HGB BLD-MCNC: 12.3 G/DL — LOW (ref 13–17)
MCHC RBC-ENTMCNC: 29.7 PG — SIGNIFICANT CHANGE UP (ref 27–34)
MCHC RBC-ENTMCNC: 32.2 GM/DL — SIGNIFICANT CHANGE UP (ref 32–36)
MCV RBC AUTO: 92.2 FL — SIGNIFICANT CHANGE UP (ref 80–100)
PLATELET # BLD AUTO: 168 K/UL — SIGNIFICANT CHANGE UP (ref 150–400)
RBC # BLD: 4.15 M/UL — LOW (ref 4.2–5.8)
RBC # FLD: 11.2 % — SIGNIFICANT CHANGE UP (ref 10.3–14.5)
WBC # BLD: 7.9 K/UL — SIGNIFICANT CHANGE UP (ref 3.8–10.5)
WBC # FLD AUTO: 7.9 K/UL — SIGNIFICANT CHANGE UP (ref 3.8–10.5)

## 2018-09-19 PROCEDURE — 99232 SBSQ HOSP IP/OBS MODERATE 35: CPT

## 2018-09-19 RX ADMIN — PANTOPRAZOLE SODIUM 40 MILLIGRAM(S): 20 TABLET, DELAYED RELEASE ORAL at 06:38

## 2018-09-19 RX ADMIN — GABAPENTIN 600 MILLIGRAM(S): 400 CAPSULE ORAL at 21:54

## 2018-09-19 RX ADMIN — Medication 100 MILLIGRAM(S): at 13:06

## 2018-09-19 RX ADMIN — Medication 100 MILLIGRAM(S): at 06:38

## 2018-09-19 RX ADMIN — HEPARIN SODIUM 5000 UNIT(S): 5000 INJECTION INTRAVENOUS; SUBCUTANEOUS at 06:38

## 2018-09-19 RX ADMIN — HEPARIN SODIUM 5000 UNIT(S): 5000 INJECTION INTRAVENOUS; SUBCUTANEOUS at 21:54

## 2018-09-19 RX ADMIN — LUBIPROSTONE 24 MICROGRAM(S): 24 CAPSULE, GELATIN COATED ORAL at 17:15

## 2018-09-19 RX ADMIN — Medication 100 MILLIGRAM(S): at 21:54

## 2018-09-19 RX ADMIN — Medication 975 MILLIGRAM(S): at 09:06

## 2018-09-19 RX ADMIN — LUBIPROSTONE 24 MICROGRAM(S): 24 CAPSULE, GELATIN COATED ORAL at 06:38

## 2018-09-19 RX ADMIN — Medication 150 MILLIGRAM(S): at 01:05

## 2018-09-19 RX ADMIN — FEBUXOSTAT 40 MILLIGRAM(S): 40 TABLET ORAL at 13:05

## 2018-09-19 RX ADMIN — Medication 1 APPLICATION(S): at 06:38

## 2018-09-19 RX ADMIN — Medication 150 MILLIGRAM(S): at 17:15

## 2018-09-19 RX ADMIN — TAMSULOSIN HYDROCHLORIDE 0.4 MILLIGRAM(S): 0.4 CAPSULE ORAL at 21:54

## 2018-09-19 RX ADMIN — HEPARIN SODIUM 5000 UNIT(S): 5000 INJECTION INTRAVENOUS; SUBCUTANEOUS at 13:05

## 2018-09-19 RX ADMIN — Medication 150 MILLIGRAM(S): at 06:38

## 2018-09-19 RX ADMIN — SERTRALINE 25 MILLIGRAM(S): 25 TABLET, FILM COATED ORAL at 13:05

## 2018-09-19 RX ADMIN — Medication 1 APPLICATION(S): at 17:15

## 2018-09-19 RX ADMIN — Medication 250 MILLIGRAM(S): at 18:44

## 2018-09-19 RX ADMIN — Medication 250 MILLIGRAM(S): at 06:38

## 2018-09-19 RX ADMIN — Medication 150 MILLIGRAM(S): at 13:05

## 2018-09-19 RX ADMIN — GABAPENTIN 300 MILLIGRAM(S): 400 CAPSULE ORAL at 13:05

## 2018-09-19 RX ADMIN — DIPHENHYDRAMINE HYDROCHLORIDE AND LIDOCAINE HYDROCHLORIDE AND ALUMINUM HYDROXIDE AND MAGNESIUM HYDRO 15 MILLILITER(S): KIT at 18:46

## 2018-09-19 RX ADMIN — SENNA PLUS 2 TABLET(S): 8.6 TABLET ORAL at 21:54

## 2018-09-19 RX ADMIN — Medication 3 MILLIGRAM(S): at 21:54

## 2018-09-19 RX ADMIN — Medication 975 MILLIGRAM(S): at 09:35

## 2018-09-19 NOTE — PROGRESS NOTE ADULT - SUBJECTIVE AND OBJECTIVE BOX
This is a 64 y/o male with h/o HTN, gouty arthritis, lumbar disc disease, OA with L4-L5 spondylolisthesis with stenosis and radiculopathy s/p L4-5 laminectomy and decompression with transforaminal lumbar interbody fusion and placement of interbody cage and posterior spinal fusion L4-5 by Dr Orlando Neville on 9/10/18 at [facility].       TODAYs SUBJECTIVE  Patient seen and examined in the AM. Feels much better this am. Denies any left radicular pain. Tooth pain improved 2. BP low yesterday and losartan d/c     [X] Constiutional WNL            [X] Cardio WNL               [X] Resp WNL  [ ] GI WNL                            [X]  WNL                    [X] Heme WNL  [X] Endo WNL                       [ ] Skin WNL                   [ ] MSK WNL  [ ] Neuro WNL                     [X] Cognitive WNL            [X] Psych WNL      Vital Signs Last 24 Hrs  T(C): 36.6 (19 Sep 2018 08:32), Max: 36.8 (18 Sep 2018 21:30)  T(F): 97.8 (19 Sep 2018 08:32), Max: 98.3 (18 Sep 2018 21:30)  HR: 88 (19 Sep 2018 08:32) (81 - 88)  BP: 107/71 (19 Sep 2018 08:32) (107/71 - 113/73)  BP(mean): --  RR: 14 (19 Sep 2018 08:32) (14 - 14)  SpO2: 95% (19 Sep 2018 08:32) (95% - 96%)    General: NAD, Resting Comfortable,   Oral cavity: right lower gum molar area - swelling noted, no tenderness                                 Cardio: S1-S2,                           Pulm: Lungs CTAB                        Abdomen: no tenderness to palpation,                                           MSK: No joint swelling                                        Ext: No C/C/E, Pulses 2+ throughout, No calf tenderness    Wounds: Surgical incision on the lower back x 2 covered with tegaderm and telfa. on the left appears intact, on the right with slight blood tinge on gauze as well as minimal serous drainage on dressing  	Decubitus Ulcers: None Present   Skin: LE with psoriatic lesions    Function: Transfers: Min assist, ambulates min assist with RW          MEDICATIONS  (STANDING):  amLODIPine   Tablet 5 milliGRAM(s) Oral daily  clindamycin   Capsule 150 milliGRAM(s) Oral every 6 hours  docusate sodium 100 milliGRAM(s) Oral three times a day  febuxostat 40 milliGRAM(s) Oral daily  fluocinonide 0.05% Cream 1 Application(s) Topical every 12 hours  gabapentin 300 milliGRAM(s) Oral daily  gabapentin 600 milliGRAM(s) Oral at bedtime  heparin  Injectable 5000 Unit(s) SubCutaneous every 8 hours  lubiprostone 24 MICROGram(s) Oral two times a day  melatonin 3 milliGRAM(s) Oral at bedtime  pantoprazole    Tablet 40 milliGRAM(s) Oral before breakfast  saccharomyces boulardii 250 milliGRAM(s) Oral two times a day  senna 2 Tablet(s) Oral at bedtime  sertraline 25 milliGRAM(s) Oral daily  tamsulosin 0.4 milliGRAM(s) Oral at bedtime    MEDICATIONS  (PRN):  acetaminophen   Tablet .. 975 milliGRAM(s) Oral every 6 hours PRN Temp greater or equal to 38C (100.4F), Mild Pain (1 - 3), Moderate Pain (4 - 6)  diazepam    Tablet 2 milliGRAM(s) Oral every 8 hours PRN spasm  FIRST- Mouthwash  BLM 15 milliLiter(s) Swish and Spit two times a day PRN Mouth Care  magnesium hydroxide Suspension 30 milliLiter(s) Oral daily PRN Constipation  oxyCODONE    IR 5 milliGRAM(s) Oral every 4 hours PRN Moderate Pain (4 - 6)  oxyCODONE    IR 10 milliGRAM(s) Oral every 4 hours PRN Severe Pain (7 - 10)        Assessment/Plan    This is a 64 y/o male with h/o HTN, gouty arthritis, lumbar disc disease, OA with L4-L5 spondylolisthesis with stenosis and radiculopathy s/p L4-5 laminectomy and decompression with transforaminal lumbar interbody fusion and placement of interbody cage and posterior spinal fusion L4-5 with functional deficits including gait and ADL impairments with decreased endurance and strength.     COMORBIDITES/ACTIVE MEDICAL ISSUES     Gait Instability, ADL impairments and Functional impairments 2/2 recent [facility]: [facility] of PT/OT     #Neuro/MSK:  -Pain control: Tylenol , Oxycodone PRN, gabapentin, valium- d/c valium. change tylenol to prn     #Cardio:  -continue norvasc. Losartan - dose reduced 9/17 to 50mg and then dc on 9/18    GI/Bowel Mgmt - on amitiza    #Renal/Lytes: Urology fu noted  -Bladder management:TOV today , voiding after d/c of multani    #Heme/Onc:  - DVT: SCD, TEDS due to spine surgery    #Skin:  -monitor surgical incision for drainage, Steroid cream for lesions    Dental abscess: On abx with florostar    Precautions / PROPHYLAXIS:   - Falls, Spinal  - ortho: Weight bearing status: WBAT   - Lungs: Aspiration, Incentive Spirometer  - Pressure injury/Skin: Turn Q2hrs while in bed, OOB to Chair, PT/OT This is a 62 y/o male with h/o HTN, gouty arthritis, lumbar disc disease, OA with L4-L5 spondylolisthesis with stenosis and radiculopathy s/p L4-5 laminectomy and decompression with transforaminal lumbar interbody fusion and placement of interbody cage and posterior spinal fusion L4-5 by Dr Orlando Neville on 9/10/18 at [facility].       TODAYs SUBJECTIVE  Patient seen and examined in the AM. Feels much better this am. Denies any left radicular pain. Tooth pain improved 2. BP low yesterday and losartan d/c     [X] Constiutional WNL            [X] Cardio WNL               [X] Resp WNL  [ ] GI WNL                            [X]  WNL                    [X] Heme WNL  [X] Endo WNL                       [ ] Skin WNL                   [ ] MSK WNL  [ ] Neuro WNL                     [X] Cognitive WNL            [X] Psych WNL      Vital Signs Last 24 Hrs  T(C): 36.6 (19 Sep 2018 08:32), Max: 36.8 (18 Sep 2018 21:30)  T(F): 97.8 (19 Sep 2018 08:32), Max: 98.3 (18 Sep 2018 21:30)  HR: 88 (19 Sep 2018 08:32) (81 - 88)  BP: 107/71 (19 Sep 2018 08:32) (107/71 - 113/73)  BP(mean): --  RR: 14 (19 Sep 2018 08:32) (14 - 14)  SpO2: 95% (19 Sep 2018 08:32) (95% - 96%)    General: NAD, Resting Comfortable,   Oral cavity: right lower gum molar area - swelling noted, no tenderness                                 Cardio: S1-S2,                           Pulm: Lungs CTAB                        Abdomen: no tenderness to palpation,                                           MSK: No joint swelling                                        Ext: No C/C/E, Pulses 2+ throughout, No calf tenderness    Wounds: Surgical incision on the lower back x 2 covered with tegaderm and telfa. on the left appears intact, on the right with slight blood tinge on gauze as well as minimal serous drainage on dressing  	Decubitus Ulcers: None Present   Skin: LE with psoriatic lesions    Function: Transfers: Min assist, ambulates min assist with RW                            12.3   7.9   )-----------( 168      ( 19 Sep 2018 06:10 )             38.2       MEDICATIONS  (STANDING):  amLODIPine   Tablet 5 milliGRAM(s) Oral daily  clindamycin   Capsule 150 milliGRAM(s) Oral every 6 hours  docusate sodium 100 milliGRAM(s) Oral three times a day  febuxostat 40 milliGRAM(s) Oral daily  fluocinonide 0.05% Cream 1 Application(s) Topical every 12 hours  gabapentin 300 milliGRAM(s) Oral daily  gabapentin 600 milliGRAM(s) Oral at bedtime  heparin  Injectable 5000 Unit(s) SubCutaneous every 8 hours  lubiprostone 24 MICROGram(s) Oral two times a day  melatonin 3 milliGRAM(s) Oral at bedtime  pantoprazole    Tablet 40 milliGRAM(s) Oral before breakfast  saccharomyces boulardii 250 milliGRAM(s) Oral two times a day  senna 2 Tablet(s) Oral at bedtime  sertraline 25 milliGRAM(s) Oral daily  tamsulosin 0.4 milliGRAM(s) Oral at bedtime    MEDICATIONS  (PRN):  acetaminophen   Tablet .. 975 milliGRAM(s) Oral every 6 hours PRN Temp greater or equal to 38C (100.4F), Mild Pain (1 - 3), Moderate Pain (4 - 6)  diazepam    Tablet 2 milliGRAM(s) Oral every 8 hours PRN spasm  FIRST- Mouthwash  BLM 15 milliLiter(s) Swish and Spit two times a day PRN Mouth Care  magnesium hydroxide Suspension 30 milliLiter(s) Oral daily PRN Constipation  oxyCODONE    IR 5 milliGRAM(s) Oral every 4 hours PRN Moderate Pain (4 - 6)  oxyCODONE    IR 10 milliGRAM(s) Oral every 4 hours PRN Severe Pain (7 - 10)        Assessment/Plan    This is a 62 y/o male with h/o HTN, gouty arthritis, lumbar disc disease, OA with L4-L5 spondylolisthesis with stenosis and radiculopathy s/p L4-5 laminectomy and decompression with transforaminal lumbar interbody fusion and placement of interbody cage and posterior spinal fusion L4-5 with functional deficits including gait and ADL impairments with decreased endurance and strength.     COMORBIDITES/ACTIVE MEDICAL ISSUES     Gait Instability, ADL impairments and Functional impairments 2/2 recent [facility]: [facility] of PT/OT     #Neuro/MSK:  -Pain control: Tylenol , Oxycodone PRN, gabapentin, valium- d/c valium. change tylenol to prn     #Cardio:  -continue norvasc. Losartan - dose reduced 9/17 to 50mg and then dc on 9/18    GI/Bowel Mgmt - on amitiza    #Renal/Lytes: Urology fu noted  -Bladder management:TOV today , voiding after d/c of multani    #Heme/Onc:  - DVT: SCD, TEDS due to spine surgery    #Skin:  -monitor surgical incision for drainage, Steroid cream for lesions    Dental abscess: On abx with florostar. CBC Normal    Precautions / PROPHYLAXIS:   - Falls, Spinal  - ortho: Weight bearing status: WBAT   - Lungs: Aspiration, Incentive Spirometer  - Pressure injury/Skin: Turn Q2hrs while in bed, OOB to Chair, PT/OT

## 2018-09-20 PROCEDURE — 99232 SBSQ HOSP IP/OBS MODERATE 35: CPT

## 2018-09-20 RX ORDER — BACITRACIN ZINC 500 UNIT/G
1 OINTMENT IN PACKET (EA) TOPICAL
Qty: 0 | Refills: 0 | Status: DISCONTINUED | OUTPATIENT
Start: 2018-09-20 | End: 2018-09-25

## 2018-09-20 RX ADMIN — Medication 1 APPLICATION(S): at 05:56

## 2018-09-20 RX ADMIN — Medication 100 MILLIGRAM(S): at 05:55

## 2018-09-20 RX ADMIN — HEPARIN SODIUM 5000 UNIT(S): 5000 INJECTION INTRAVENOUS; SUBCUTANEOUS at 05:56

## 2018-09-20 RX ADMIN — DIPHENHYDRAMINE HYDROCHLORIDE AND LIDOCAINE HYDROCHLORIDE AND ALUMINUM HYDROXIDE AND MAGNESIUM HYDRO 15 MILLILITER(S): KIT at 22:26

## 2018-09-20 RX ADMIN — Medication 250 MILLIGRAM(S): at 05:55

## 2018-09-20 RX ADMIN — Medication 1 APPLICATION(S): at 17:20

## 2018-09-20 RX ADMIN — Medication 100 MILLIGRAM(S): at 22:26

## 2018-09-20 RX ADMIN — Medication 3 MILLIGRAM(S): at 22:26

## 2018-09-20 RX ADMIN — OXYCODONE HYDROCHLORIDE 10 MILLIGRAM(S): 5 TABLET ORAL at 17:27

## 2018-09-20 RX ADMIN — OXYCODONE HYDROCHLORIDE 10 MILLIGRAM(S): 5 TABLET ORAL at 08:59

## 2018-09-20 RX ADMIN — LUBIPROSTONE 24 MICROGRAM(S): 24 CAPSULE, GELATIN COATED ORAL at 05:55

## 2018-09-20 RX ADMIN — Medication 150 MILLIGRAM(S): at 17:22

## 2018-09-20 RX ADMIN — Medication 150 MILLIGRAM(S): at 00:35

## 2018-09-20 RX ADMIN — HEPARIN SODIUM 5000 UNIT(S): 5000 INJECTION INTRAVENOUS; SUBCUTANEOUS at 22:25

## 2018-09-20 RX ADMIN — GABAPENTIN 600 MILLIGRAM(S): 400 CAPSULE ORAL at 22:26

## 2018-09-20 RX ADMIN — SERTRALINE 25 MILLIGRAM(S): 25 TABLET, FILM COATED ORAL at 13:11

## 2018-09-20 RX ADMIN — PANTOPRAZOLE SODIUM 40 MILLIGRAM(S): 20 TABLET, DELAYED RELEASE ORAL at 05:55

## 2018-09-20 RX ADMIN — HEPARIN SODIUM 5000 UNIT(S): 5000 INJECTION INTRAVENOUS; SUBCUTANEOUS at 13:11

## 2018-09-20 RX ADMIN — OXYCODONE HYDROCHLORIDE 10 MILLIGRAM(S): 5 TABLET ORAL at 09:34

## 2018-09-20 RX ADMIN — FEBUXOSTAT 40 MILLIGRAM(S): 40 TABLET ORAL at 13:11

## 2018-09-20 RX ADMIN — TAMSULOSIN HYDROCHLORIDE 0.4 MILLIGRAM(S): 0.4 CAPSULE ORAL at 22:26

## 2018-09-20 RX ADMIN — Medication 150 MILLIGRAM(S): at 13:11

## 2018-09-20 RX ADMIN — Medication 150 MILLIGRAM(S): at 05:56

## 2018-09-20 RX ADMIN — Medication 250 MILLIGRAM(S): at 17:22

## 2018-09-20 RX ADMIN — Medication 1 TABLET(S): at 13:11

## 2018-09-20 NOTE — PROGRESS NOTE ADULT - SUBJECTIVE AND OBJECTIVE BOX
Patient is a 63y old  Male who presents with a chief complaint of s/p transforaminal, L4-5, lumbar interbody fusion (19 Sep 2018 12:27)    HPI:  This is a 64 y/o male with h/o HTN, gouty arthritis, lumbar disc disease, OA with L4-L5 spondylolisthesis with stenosis and radiculopathy s/p L4-5 laminectomy and decompression with transforaminal lumbar interbody fusion and placement of interbody cage and posterior spinal fusion L4-5 by Dr Orlando Neville on 9/10/18 at [facility].     Patient to continue use LSO and is full WBAT. See by physical and ocupational therapists. Pt is ambulating 30 ft with RW and min to mod A, min A for UE dressing, max A for LE dressing,      passed TOV. denies voiding difficulty             Interval Events:  Patient seen and examined at bedside.    MEDICATIONS:  MEDICATIONS  (STANDING):  amLODIPine   Tablet 5 milliGRAM(s) Oral daily  BACItracin   Ointment 1 Application(s) Topical two times a day  clindamycin   Capsule 150 milliGRAM(s) Oral every 6 hours  docusate sodium 100 milliGRAM(s) Oral three times a day  febuxostat 40 milliGRAM(s) Oral daily  fluocinonide 0.05% Cream 1 Application(s) Topical every 12 hours  gabapentin 300 milliGRAM(s) Oral daily  gabapentin 600 milliGRAM(s) Oral at bedtime  heparin  Injectable 5000 Unit(s) SubCutaneous every 8 hours  lubiprostone 24 MICROGram(s) Oral two times a day  melatonin 3 milliGRAM(s) Oral at bedtime  pantoprazole    Tablet 40 milliGRAM(s) Oral before breakfast  saccharomyces boulardii 250 milliGRAM(s) Oral two times a day  senna 2 Tablet(s) Oral at bedtime  sertraline 25 milliGRAM(s) Oral daily  tamsulosin 0.4 milliGRAM(s) Oral at bedtime    MEDICATIONS  (PRN):  acetaminophen   Tablet .. 975 milliGRAM(s) Oral every 6 hours PRN Temp greater or equal to 38C (100.4F), Mild Pain (1 - 3), Moderate Pain (4 - 6)  diazepam    Tablet 2 milliGRAM(s) Oral every 8 hours PRN spasm  FIRST- Mouthwash  BLM 15 milliLiter(s) Swish and Spit two times a day PRN Mouth Care  magnesium hydroxide Suspension 30 milliLiter(s) Oral daily PRN Constipation  oxyCODONE    IR 5 milliGRAM(s) Oral every 4 hours PRN Moderate Pain (4 - 6)  oxyCODONE    IR 10 milliGRAM(s) Oral every 4 hours PRN Severe Pain (7 - 10)      Allergies    No Known Allergies    Intolerances        T(C): 36.3 (09-19-18 @ 20:45), Max: 36.8 (09-18-18 @ 21:30)  T(F): 97.3 (09-19-18 @ 20:45), Max: 98.3 (09-18-18 @ 21:30)  HR: 80 (09-19-18 @ 20:45) (80 - 88)  BP: 110/70 (09-19-18 @ 20:45) (98/59 - 113/73)  RR: 14 (09-19-18 @ 20:45) (14 - 14)  SpO2: 95% (09-19-18 @ 20:45) (95% - 96%)          09-18-18 @ 07:01  -  09-19-18 @ 07:00  --------------------------------------------------------  IN:  Total IN: 0 mL    OUT:    Indwelling Catheter - Urethral: 1100 mL  Total OUT: 1100 mL    Total NET: -1100 mL          LABS:      CBC Full  -  ( 19 Sep 2018 06:10 )  WBC Count : 7.9 K/uL  Hemoglobin : 12.3 g/dL  Hematocrit : 38.2 %  Platelet Count - Automated : 168 K/uL  Mean Cell Volume : 92.2 fl  Mean Cell Hemoglobin : 29.7 pg  Mean Cell Hemoglobin Concentration : 32.2 gm/dL  Auto Neutrophil # : x  Auto Lymphocyte # : x  Auto Monocyte # : x  Auto Eosinophil # : x  Auto Basophil # : x  Auto Neutrophil % : x  Auto Lymphocyte % : x  Auto Monocyte % : x  Auto Eosinophil % : x  Auto Basophil % : x                        Physical Exam    Constitutional: alert, no acute distress    Abdomen: soft, nontender, nondistended, no HSM    Genitourinary: no bladder distention

## 2018-09-20 NOTE — PROGRESS NOTE ADULT - SUBJECTIVE AND OBJECTIVE BOX
This is a 64 y/o male with h/o HTN, gouty arthritis, lumbar disc disease, OA with L4-L5 spondylolisthesis with stenosis and radiculopathy s/p L4-5 laminectomy and decompression with transforaminal lumbar interbody fusion and placement of interbody cage and posterior spinal fusion L4-5 by Dr Orlando Neville on 9/10/18 at [facility].       TODAYs SUBJECTIVE  Patient seen and examined in the AM. Feels better, but tired and weak from therapy. Denies any new pain. 2. Tooth pain improved  3. c/o loose stool this am 4. Voiding well after dc of multani 5. Amlodipine held in am for 3 days    [X] Constiutional WNL            [X] Cardio WNL               [X] Resp WNL  [ ] GI-loose stool                       [X]  WNL                    [X] Heme WNL  [X] Endo WNL                       [ ] Skin WNL                   [ ] MSK WNL  [ ] Neuro WNL                     [X] Cognitive WNL            [X] Psych WNL      Vital Signs Last 24 Hrs  T(C): 36.3 (20 Sep 2018 07:23), Max: 36.3 (19 Sep 2018 20:45)  T(F): 97.4 (20 Sep 2018 07:23), Max: 97.4 (20 Sep 2018 07:23)  HR: 82 (20 Sep 2018 07:23) (80 - 82)  BP: 102/98 (20 Sep 2018 07:23) (102/98 - 110/70)  BP(mean): --  RR: 14 (20 Sep 2018 07:23) (14 - 14)  SpO2: 96% (20 Sep 2018 07:23) (95% - 96%)    General: NAD, Resting Comfortable,   Oral cavity: right lower gum molar area - swelling noted, no tenderness                                 Cardio: S1-S2,                           Pulm: Lungs CTAB                        Abdomen: no tenderness to palpation,                                           MSK: No joint swelling                                        Ext: No C/C/E, Pulses 2+ throughout, No calf tenderness    Wounds: Surgical incision on the lower back x 2 covered with tegaderm and telfa. on the left appears intact,   Decubitus Ulcers: None Present   Skin: LE with psoriatic lesions    Function: Transfers: Min assist, ambulates min assist with RW                            12.3   7.9   )-----------( 168      ( 19 Sep 2018 06:10 )             38.2       MEDICATIONS  (STANDING):  amLODIPine   Tablet 5 milliGRAM(s) Oral daily  BACItracin   Ointment 1 Application(s) Topical two times a day  clindamycin   Capsule 150 milliGRAM(s) Oral every 6 hours  docusate sodium 100 milliGRAM(s) Oral three times a day  febuxostat 40 milliGRAM(s) Oral daily  fluocinonide 0.05% Cream 1 Application(s) Topical every 12 hours  gabapentin 300 milliGRAM(s) Oral daily  gabapentin 600 milliGRAM(s) Oral at bedtime  heparin  Injectable 5000 Unit(s) SubCutaneous every 8 hours  melatonin 3 milliGRAM(s) Oral at bedtime  pantoprazole    Tablet 40 milliGRAM(s) Oral before breakfast  saccharomyces boulardii 250 milliGRAM(s) Oral two times a day  senna 2 Tablet(s) Oral at bedtime  sertraline 25 milliGRAM(s) Oral daily  tamsulosin 0.4 milliGRAM(s) Oral at bedtime    MEDICATIONS  (PRN):  acetaminophen   Tablet .. 975 milliGRAM(s) Oral every 6 hours PRN Temp greater or equal to 38C (100.4F), Mild Pain (1 - 3), Moderate Pain (4 - 6)  FIRST- Mouthwash  BLM 15 milliLiter(s) Swish and Spit two times a day PRN Mouth Care  magnesium hydroxide Suspension 30 milliLiter(s) Oral daily PRN Constipation  oxyCODONE    IR 5 milliGRAM(s) Oral every 4 hours PRN Moderate Pain (4 - 6)  oxyCODONE    IR 10 milliGRAM(s) Oral every 4 hours PRN Severe Pain (7 - 10)        Assessment/Plan    This is a 64 y/o male with h/o HTN, gouty arthritis, lumbar disc disease, OA with L4-L5 spondylolisthesis with stenosis and radiculopathy s/p L4-5 laminectomy and decompression with transforaminal lumbar interbody fusion and placement of interbody cage and posterior spinal fusion L4-5 with functional deficits including gait and ADL impairments with decreased endurance and strength.     COMORBIDITES/ACTIVE MEDICAL ISSUES     Gait Instability, ADL impairments and Functional impairments 2/2 recent [facility]: [facility] of PT/OT     #Neuro/MSK:  -Pain control: Tylenol , Oxycodone PRN, gabapentin, valium- d/c valium. change tylenol to prn     #Cardio:  -continue norvasc. Losartan - dose reduced 9/17 to 50mg and then dc on 9/18    GI/Bowel Mgmt - d/c  amitiza. On Protonix for GI prophylaxis and on senna. will d/c senna for now and monitor    #Renal/Lytes: Urology fu noted  -Bladder management:TOV today , voiding after d/c of multani    #Heme/Onc:  - DVT: SCD, TEDS due to spine surgery. Started on heparin after surgery clearance.     #Skin:  -monitor surgical incision for drainage, Steroid cream for psoriatic  lesions    Dental abscess: On abx( clindaMYCIN) with florostar. CBC Normal    Precautions / PROPHYLAXIS:   - Falls, Spinal  - ortho: Weight bearing status: WBAT   - Lungs: Aspiration, Incentive Spirometer  - Pressure injury/Skin: Turn Q2hrs while in bed, OOB to Chair, PT/OT This is a 64 y/o male with h/o HTN, gouty arthritis, lumbar disc disease, OA with L4-L5 spondylolisthesis with stenosis and radiculopathy s/p L4-5 laminectomy and decompression with transforaminal lumbar interbody fusion and placement of interbody cage and posterior spinal fusion L4-5 by Dr Orlando Neville on 9/10/18 at [facility].       TODAYs SUBJECTIVE  Patient seen and examined in the AM. Feels better, but tired and weak from therapy. Denies any new pain. 2. Tooth pain improved  3. c/o loose stool this am 4. Voiding well after dc of multani 5. Amlodipine held in am for 3 days    [X] Constiutional WNL            [X] Cardio WNL               [X] Resp WNL  [ ] GI-loose stool                       [X]  WNL                    [X] Heme WNL  [X] Endo WNL                       [ ] Skin WNL                   [ ] MSK WNL  [ ] Neuro WNL                     [X] Cognitive WNL            [X] Psych WNL      Vital Signs Last 24 Hrs  T(C): 36.3 (20 Sep 2018 07:23), Max: 36.3 (19 Sep 2018 20:45)  T(F): 97.4 (20 Sep 2018 07:23), Max: 97.4 (20 Sep 2018 07:23)  HR: 82 (20 Sep 2018 07:23) (80 - 82)  BP: 102/98 (20 Sep 2018 07:23) (102/98 - 110/70)  BP(mean): --  RR: 14 (20 Sep 2018 07:23) (14 - 14)  SpO2: 96% (20 Sep 2018 07:23) (95% - 96%)    General: NAD, Resting Comfortable,   Oral cavity: right lower gum molar area - swelling noted, no tenderness                                 Cardio: S1-S2,                           Pulm: Lungs CTAB                        Abdomen: no tenderness to palpation,                                           MSK: No joint swelling                                        Ext: No C/C/E, Pulses 2+ throughout, No calf tenderness    Wounds: Surgical incision on the lower back x 2 covered with tegaderm and telfa. on the left appears intact,   Decubitus Ulcers: None Present   Skin: LE with psoriatic lesions    Function: Transfers: Min assist, ambulates min assist with RW                            12.3   7.9   )-----------( 168      ( 19 Sep 2018 06:10 )             38.2       MEDICATIONS  (STANDING):  amLODIPine   Tablet 5 milliGRAM(s) Oral daily  BACItracin   Ointment 1 Application(s) Topical two times a day  clindamycin   Capsule 150 milliGRAM(s) Oral every 6 hours  docusate sodium 100 milliGRAM(s) Oral three times a day  febuxostat 40 milliGRAM(s) Oral daily  fluocinonide 0.05% Cream 1 Application(s) Topical every 12 hours  gabapentin 300 milliGRAM(s) Oral daily  gabapentin 600 milliGRAM(s) Oral at bedtime  heparin  Injectable 5000 Unit(s) SubCutaneous every 8 hours  melatonin 3 milliGRAM(s) Oral at bedtime  pantoprazole    Tablet 40 milliGRAM(s) Oral before breakfast  saccharomyces boulardii 250 milliGRAM(s) Oral two times a day  senna 2 Tablet(s) Oral at bedtime  sertraline 25 milliGRAM(s) Oral daily  tamsulosin 0.4 milliGRAM(s) Oral at bedtime    MEDICATIONS  (PRN):  acetaminophen   Tablet .. 975 milliGRAM(s) Oral every 6 hours PRN Temp greater or equal to 38C (100.4F), Mild Pain (1 - 3), Moderate Pain (4 - 6)  FIRST- Mouthwash  BLM 15 milliLiter(s) Swish and Spit two times a day PRN Mouth Care  magnesium hydroxide Suspension 30 milliLiter(s) Oral daily PRN Constipation  oxyCODONE    IR 5 milliGRAM(s) Oral every 4 hours PRN Moderate Pain (4 - 6)  oxyCODONE    IR 10 milliGRAM(s) Oral every 4 hours PRN Severe Pain (7 - 10)        Assessment/Plan    This is a 64 y/o male with h/o HTN, gouty arthritis, lumbar disc disease, OA with L4-L5 spondylolisthesis with stenosis and radiculopathy s/p L4-5 laminectomy and decompression with transforaminal lumbar interbody fusion and placement of interbody cage and posterior spinal fusion L4-5 with functional deficits including gait and ADL impairments with decreased endurance and strength.     COMORBIDITES/ACTIVE MEDICAL ISSUES     Gait Instability, ADL impairments and Functional impairments 2/2 recent [facility]: Comprehensive Rehab Program of PT/OT     #Neuro/MSK:  -Pain control: Tylenol , Oxycodone PRN, gabapentin, valium- d/c valium. change tylenol to prn . d/c am neurontin as patient c/o fatigue    #Cardio:  -continue norvasc. Losartan - dose reduced 9/17 to 50mg and then dc on 9/18    GI/Bowel Mgmt - d/c  amitiza. On Protonix for GI prophylaxis and on senna. will d/c senna for now and monitor    #Renal/Lytes: Urology fu noted  -Bladder management:TOV today , voiding after d/c of multani. continue flomax    #Heme/Onc:  - DVT: SCD, TEDS due to spine surgery. Started on heparin after surgery clearance.     #Skin:  -monitor surgical incision for drainage, Steroid cream for psoriatic  lesions    Dental abscess: On abx( clindaMYCIN) with florostar. CBC Normal    Diet: regular with thin. Add MVI    Precautions / PROPHYLAXIS:   - Falls, Spinal  - ortho: Weight bearing status: WBAT   - Lungs: Aspiration, Incentive Spirometer  - Pressure injury/Skin: Turn Q2hrs while in bed, OOB to Chair, PT/OT

## 2018-09-21 PROCEDURE — 99232 SBSQ HOSP IP/OBS MODERATE 35: CPT

## 2018-09-21 PROCEDURE — 99233 SBSQ HOSP IP/OBS HIGH 50: CPT

## 2018-09-21 RX ADMIN — OXYCODONE HYDROCHLORIDE 10 MILLIGRAM(S): 5 TABLET ORAL at 08:04

## 2018-09-21 RX ADMIN — HEPARIN SODIUM 5000 UNIT(S): 5000 INJECTION INTRAVENOUS; SUBCUTANEOUS at 06:02

## 2018-09-21 RX ADMIN — Medication 250 MILLIGRAM(S): at 06:02

## 2018-09-21 RX ADMIN — Medication 100 MILLIGRAM(S): at 06:02

## 2018-09-21 RX ADMIN — OXYCODONE HYDROCHLORIDE 10 MILLIGRAM(S): 5 TABLET ORAL at 14:40

## 2018-09-21 RX ADMIN — Medication 1 APPLICATION(S): at 17:20

## 2018-09-21 RX ADMIN — OXYCODONE HYDROCHLORIDE 10 MILLIGRAM(S): 5 TABLET ORAL at 08:59

## 2018-09-21 RX ADMIN — Medication 100 MILLIGRAM(S): at 21:07

## 2018-09-21 RX ADMIN — Medication 1 APPLICATION(S): at 06:02

## 2018-09-21 RX ADMIN — Medication 150 MILLIGRAM(S): at 23:03

## 2018-09-21 RX ADMIN — PANTOPRAZOLE SODIUM 40 MILLIGRAM(S): 20 TABLET, DELAYED RELEASE ORAL at 06:02

## 2018-09-21 RX ADMIN — AMLODIPINE BESYLATE 5 MILLIGRAM(S): 2.5 TABLET ORAL at 06:02

## 2018-09-21 RX ADMIN — Medication 150 MILLIGRAM(S): at 06:25

## 2018-09-21 RX ADMIN — Medication 100 MILLIGRAM(S): at 15:03

## 2018-09-21 RX ADMIN — OXYCODONE HYDROCHLORIDE 10 MILLIGRAM(S): 5 TABLET ORAL at 13:43

## 2018-09-21 RX ADMIN — GABAPENTIN 600 MILLIGRAM(S): 400 CAPSULE ORAL at 21:07

## 2018-09-21 RX ADMIN — Medication 3 MILLIGRAM(S): at 21:07

## 2018-09-21 RX ADMIN — Medication 1 TABLET(S): at 11:28

## 2018-09-21 RX ADMIN — HEPARIN SODIUM 5000 UNIT(S): 5000 INJECTION INTRAVENOUS; SUBCUTANEOUS at 21:07

## 2018-09-21 RX ADMIN — Medication 150 MILLIGRAM(S): at 17:20

## 2018-09-21 RX ADMIN — Medication 150 MILLIGRAM(S): at 01:00

## 2018-09-21 RX ADMIN — HEPARIN SODIUM 5000 UNIT(S): 5000 INJECTION INTRAVENOUS; SUBCUTANEOUS at 15:03

## 2018-09-21 RX ADMIN — Medication 250 MILLIGRAM(S): at 17:20

## 2018-09-21 RX ADMIN — FEBUXOSTAT 40 MILLIGRAM(S): 40 TABLET ORAL at 11:28

## 2018-09-21 RX ADMIN — TAMSULOSIN HYDROCHLORIDE 0.4 MILLIGRAM(S): 0.4 CAPSULE ORAL at 21:07

## 2018-09-21 RX ADMIN — Medication 150 MILLIGRAM(S): at 11:29

## 2018-09-21 RX ADMIN — SERTRALINE 25 MILLIGRAM(S): 25 TABLET, FILM COATED ORAL at 11:28

## 2018-09-21 NOTE — DIETITIAN INITIAL EVALUATION ADULT. - OTHER INFO
No weight change reported.  Patient has some difficulty chewing secondary to dental issues.  Modified diet consistency offered but patient declined stating he will chew on the other side. No pressure injuries noted.

## 2018-09-21 NOTE — PROGRESS NOTE ADULT - SUBJECTIVE AND OBJECTIVE BOX
This is a 64 y/o male with h/o HTN, gouty arthritis, lumbar disc disease, OA with L4-L5 spondylolisthesis with stenosis and radiculopathy s/p L4-5 laminectomy and decompression with transforaminal lumbar interbody fusion and placement of interbody cage and posterior spinal fusion L4-5 by Dr Orlando Neville on 9/10/18 at [facility].     Subjective  9/21: Pt seen and examined. Dental pain improved. Has LLE pain, controlled with medication. Voiding without difficulty.         PAST MEDICAL & SURGICAL HISTORY:  Spinal stenosis  Radiculopathy  Spondylolisthesis  Varicosities of leg  Cyst of left kidney  Renal insufficiency  Psoriasis  Osteoarthritis  Nephrolithiasis  Lumbar disc disease  Gouty arthritis  Essential hypertension  Allergic rhinitis  S/P lumbar fusion  History of dental surgery    Vital Signs Last 24 Hrs  T(C): 36.4 (21 Sep 2018 07:40), Max: 36.6 (20 Sep 2018 20:30)  T(F): 97.5 (21 Sep 2018 07:40), Max: 97.8 (20 Sep 2018 20:30)  HR: 73 (21 Sep 2018 07:40) (73 - 77)  BP: 106/70 (21 Sep 2018 07:40) (104/67 - 106/70)  BP(mean): --  RR: 14 (21 Sep 2018 07:40) (14 - 14)  SpO2: 94% (21 Sep 2018 07:40) (94% - 94%)        PHYSICAL EXAM:  GENERAL: NAD  HEENT mmm no gross caries or abscess, no adenopathy  NECK: Supple  NERVOUS SYSTEM:  awake and alert  HEART: S1s2 NL , RRR  CHEST/LUNG: Clear to percussion bilaterally  ABDOMEN: Soft, Nontender, Nondistended; Bowel sounds present  EXTREMITIES:  No edema             Labs  No new labs, old labs reviewed      Imaging Personally Reviewed:  [ ] YES  [ ] NO      PROBLEM LIST  1. s/p L4-5 laminectomy and decompression with transforaminal lumbar interbody fusion  PT/OT per rehab    2.  dental abscess. Seen by Dr Dunlap, MARSHALL. continue clindamycin  on probiotic    3. Constipation  colace, senna, ducolax, lactulose, miralax, MOM    4. HTN  Cont norvasc/Losartan    5.Urinary Retention  passed TOV, continue flomax  urology eval noted    6. DVT prophylaxis  - heparin sq    Care Discussed with Consultants/Other Providers [ x] YES  [ ] NO

## 2018-09-21 NOTE — DIETITIAN INITIAL EVALUATION ADULT. - ADHERENCE
good/Reports following diet PTA.  Patient receptive to review of diet restrictions and accepted written materials to take home.

## 2018-09-21 NOTE — PROGRESS NOTE ADULT - SUBJECTIVE AND OBJECTIVE BOX
This is a 64 y/o male with h/o HTN, gouty arthritis, lumbar disc disease, OA with L4-L5 spondylolisthesis with stenosis and radiculopathy s/p L4-5 laminectomy and decompression with transforaminal lumbar interbody fusion and placement of interbody cage and posterior spinal fusion L4-5 by Dr Orlando Neville on 9/10/18 at [facility].       TODAYs SUBJECTIVE  Patient seen and examined in the AM. Feels better and less fatigued. But left leg radicular symptoms still present and interfering in therapy. 2. LSO ill fitting       [X] Constiutional WNL            [X] Cardio WNL               [X] Resp WNL  [ ] GI-loose stool                       [X]  WNL                    [X] Heme WNL  [X] Endo WNL                       [ ] Skin WNL                   [ ] MSK WNL  [ ] Neuro WNL                     [X] Cognitive WNL            [X] Psych WNL    Vital Signs Last 24 Hrs  T(C): 36.4 (21 Sep 2018 07:40), Max: 36.6 (20 Sep 2018 20:30)  T(F): 97.5 (21 Sep 2018 07:40), Max: 97.8 (20 Sep 2018 20:30)  HR: 73 (21 Sep 2018 07:40) (73 - 77)  BP: 106/70 (21 Sep 2018 07:40) (104/67 - 106/70)  BP(mean): --  RR: 14 (21 Sep 2018 07:40) (14 - 14)  SpO2: 94% (21 Sep 2018 07:40) (94% - 94%)        General: NAD, sitting in chair   Oral cavity: right lower gum molar area - swelling improved                                 Cardio: S1-S2,                           Pulm: Lungs CTAB                        Abdomen: no tenderness to palpation,                                                              Ext: No C/C/E, Pulses 2+ throughout, No calf tenderness    Wounds: Surgical incision on the lower back x 2 covered with tegaderm and telfa. on the left appears intact, much improved, no drainage however some irritation noted along edges of tegaderm   Decubitus Ulcers: None Present   Skin: LE with psoriatic lesions    Function: Transfers: Min assist, ambulates min assist with RW                            12.3   7.9   )-----------( 168      ( 19 Sep 2018 06:10 )             38.2       MEDICATIONS  (STANDING):  amLODIPine   Tablet 5 milliGRAM(s) Oral daily  BACItracin   Ointment 1 Application(s) Topical two times a day  clindamycin   Capsule 150 milliGRAM(s) Oral every 6 hours  docusate sodium 100 milliGRAM(s) Oral three times a day  febuxostat 40 milliGRAM(s) Oral daily  fluocinonide 0.05% Cream 1 Application(s) Topical every 12 hours  gabapentin 600 milliGRAM(s) Oral at bedtime  heparin  Injectable 5000 Unit(s) SubCutaneous every 8 hours  melatonin 3 milliGRAM(s) Oral at bedtime  multivitamin 1 Tablet(s) Oral daily  pantoprazole    Tablet 40 milliGRAM(s) Oral before breakfast  saccharomyces boulardii 250 milliGRAM(s) Oral two times a day  sertraline 25 milliGRAM(s) Oral daily  tamsulosin 0.4 milliGRAM(s) Oral at bedtime    MEDICATIONS  (PRN):  acetaminophen   Tablet .. 975 milliGRAM(s) Oral every 6 hours PRN Temp greater or equal to 38C (100.4F), Mild Pain (1 - 3), Moderate Pain (4 - 6)  FIRST- Mouthwash  BLM 15 milliLiter(s) Swish and Spit two times a day PRN Mouth Care  magnesium hydroxide Suspension 30 milliLiter(s) Oral daily PRN Constipation  oxyCODONE    IR 5 milliGRAM(s) Oral every 4 hours PRN Moderate Pain (4 - 6)  oxyCODONE    IR 10 milliGRAM(s) Oral every 4 hours PRN Severe Pain (7 - 10)        Assessment/Plan    This is a 64 y/o male with h/o HTN, gouty arthritis, lumbar disc disease, OA with L4-L5 spondylolisthesis with stenosis and radiculopathy s/p L4-5 laminectomy and decompression with transforaminal lumbar interbody fusion and placement of interbody cage and posterior spinal fusion L4-5 with functional deficits including gait and ADL impairments with decreased endurance and strength.     COMORBIDITES/ACTIVE MEDICAL ISSUES     Gait Instability, ADL impairments and Functional impairments 2/2 recent [facility]: Comprehensive Rehab Program of PT/OT     #Neuro/MSK:  -Pain control: Tylenol , Oxycodone PRN, gabapentin, valium d/c earlier this week. tylenol prn . d/c am neurontin as patient c/o fatigue    #Cardio:  -continue norvasc. Losartan - dose reduced 9/17 to 50mg and then dc on 9/18    GI/Bowel Mgmt - d/c  amitiza. On Protonix for GI prophylaxis,  Off all bowel regimen as patient had large watery stool yesterday.     #Renal/Lytes: Urology fu noted  -Bladder management:TOV 9/20, voiding after d/c of multani. continue flomax    #Heme/Onc:    DVT: SCD, TEDS due to spine surgery. Now on heparin sq     #Skin:  -monitor surgical incision for drainage, Steroid cream for psoriatic  lesions    Dental abscess: On abx( clindamycin) with florostar. CBC Normal    Diet: regular with thin. Add MVI    Ill fitting brace: will reach out to surgeon's office to get more information regarding orthotist    Precautions / PROPHYLAXIS:   - Falls, Spinal  - ortho: Weight bearing status: WBAT   - Lungs: Aspiration, Incentive Spirometer  - Pressure injury/Skin: Turn Q2hrs while in bed, OOB to Chair, PT/OT

## 2018-09-22 DIAGNOSIS — L40.9 PSORIASIS, UNSPECIFIED: ICD-10-CM

## 2018-09-22 DIAGNOSIS — M51.9 UNSPECIFIED THORACIC, THORACOLUMBAR AND LUMBOSACRAL INTERVERTEBRAL DISC DISORDER: ICD-10-CM

## 2018-09-22 DIAGNOSIS — M54.16 RADICULOPATHY, LUMBAR REGION: ICD-10-CM

## 2018-09-22 PROCEDURE — 99232 SBSQ HOSP IP/OBS MODERATE 35: CPT

## 2018-09-22 RX ORDER — OXYCODONE HYDROCHLORIDE 5 MG/1
10 TABLET ORAL EVERY 4 HOURS
Qty: 0 | Refills: 0 | Status: DISCONTINUED | OUTPATIENT
Start: 2018-09-22 | End: 2018-09-27

## 2018-09-22 RX ADMIN — OXYCODONE HYDROCHLORIDE 10 MILLIGRAM(S): 5 TABLET ORAL at 21:53

## 2018-09-22 RX ADMIN — Medication 3 MILLIGRAM(S): at 21:53

## 2018-09-22 RX ADMIN — Medication 150 MILLIGRAM(S): at 05:19

## 2018-09-22 RX ADMIN — Medication 1 TABLET(S): at 13:10

## 2018-09-22 RX ADMIN — OXYCODONE HYDROCHLORIDE 10 MILLIGRAM(S): 5 TABLET ORAL at 18:54

## 2018-09-22 RX ADMIN — HEPARIN SODIUM 5000 UNIT(S): 5000 INJECTION INTRAVENOUS; SUBCUTANEOUS at 21:53

## 2018-09-22 RX ADMIN — Medication 975 MILLIGRAM(S): at 05:28

## 2018-09-22 RX ADMIN — Medication 975 MILLIGRAM(S): at 16:32

## 2018-09-22 RX ADMIN — OXYCODONE HYDROCHLORIDE 10 MILLIGRAM(S): 5 TABLET ORAL at 16:32

## 2018-09-22 RX ADMIN — Medication 150 MILLIGRAM(S): at 13:09

## 2018-09-22 RX ADMIN — Medication 100 MILLIGRAM(S): at 21:52

## 2018-09-22 RX ADMIN — Medication 1 APPLICATION(S): at 17:37

## 2018-09-22 RX ADMIN — HEPARIN SODIUM 5000 UNIT(S): 5000 INJECTION INTRAVENOUS; SUBCUTANEOUS at 05:20

## 2018-09-22 RX ADMIN — GABAPENTIN 600 MILLIGRAM(S): 400 CAPSULE ORAL at 21:53

## 2018-09-22 RX ADMIN — Medication 100 MILLIGRAM(S): at 05:20

## 2018-09-22 RX ADMIN — OXYCODONE HYDROCHLORIDE 10 MILLIGRAM(S): 5 TABLET ORAL at 22:30

## 2018-09-22 RX ADMIN — Medication 1 APPLICATION(S): at 05:19

## 2018-09-22 RX ADMIN — Medication 100 MILLIGRAM(S): at 13:09

## 2018-09-22 RX ADMIN — FEBUXOSTAT 40 MILLIGRAM(S): 40 TABLET ORAL at 13:10

## 2018-09-22 RX ADMIN — Medication 975 MILLIGRAM(S): at 14:35

## 2018-09-22 RX ADMIN — SERTRALINE 25 MILLIGRAM(S): 25 TABLET, FILM COATED ORAL at 13:10

## 2018-09-22 RX ADMIN — Medication 1 APPLICATION(S): at 05:21

## 2018-09-22 RX ADMIN — Medication 150 MILLIGRAM(S): at 17:36

## 2018-09-22 RX ADMIN — TAMSULOSIN HYDROCHLORIDE 0.4 MILLIGRAM(S): 0.4 CAPSULE ORAL at 21:52

## 2018-09-22 RX ADMIN — Medication 975 MILLIGRAM(S): at 07:13

## 2018-09-22 RX ADMIN — Medication 250 MILLIGRAM(S): at 17:36

## 2018-09-22 RX ADMIN — HEPARIN SODIUM 5000 UNIT(S): 5000 INJECTION INTRAVENOUS; SUBCUTANEOUS at 13:09

## 2018-09-22 RX ADMIN — Medication 250 MILLIGRAM(S): at 05:21

## 2018-09-22 RX ADMIN — PANTOPRAZOLE SODIUM 40 MILLIGRAM(S): 20 TABLET, DELAYED RELEASE ORAL at 05:20

## 2018-09-22 NOTE — PROGRESS NOTE ADULT - SUBJECTIVE AND OBJECTIVE BOX
Patient is a 63y old  Male who presents with a chief complaint of s/p transforaminal, L4-5, lumbar interbody fusion (21 Sep 2018 11:11)  Patient seen and examined at bedside.  Pt. with no complaints this morning, have normal BM's, no N/V/D.    ALLERGIES:  No Known Allergies    MEDICATIONS  (STANDING):  amLODIPine   Tablet 5 milliGRAM(s) Oral daily  BACItracin   Ointment 1 Application(s) Topical two times a day  clindamycin   Capsule 150 milliGRAM(s) Oral every 6 hours  docusate sodium 100 milliGRAM(s) Oral three times a day  febuxostat 40 milliGRAM(s) Oral daily  fluocinonide 0.05% Cream 1 Application(s) Topical every 12 hours  gabapentin 600 milliGRAM(s) Oral at bedtime  heparin  Injectable 5000 Unit(s) SubCutaneous every 8 hours  melatonin 3 milliGRAM(s) Oral at bedtime  multivitamin 1 Tablet(s) Oral daily  pantoprazole    Tablet 40 milliGRAM(s) Oral before breakfast  saccharomyces boulardii 250 milliGRAM(s) Oral two times a day  sertraline 25 milliGRAM(s) Oral daily  tamsulosin 0.4 milliGRAM(s) Oral at bedtime    MEDICATIONS  (PRN):  acetaminophen   Tablet .. 975 milliGRAM(s) Oral every 6 hours PRN Temp greater or equal to 38C (100.4F), Mild Pain (1 - 3), Moderate Pain (4 - 6)  FIRST- Mouthwash  BLM 15 milliLiter(s) Swish and Spit two times a day PRN Mouth Care  magnesium hydroxide Suspension 30 milliLiter(s) Oral daily PRN Constipation    Vital Signs Last 24 Hrs  T(F): 98.2 (21 Sep 2018 20:38), Max: 98.2 (21 Sep 2018 20:38)  HR: 74 (22 Sep 2018 05:17) (69 - 74)  BP: 107/66 (22 Sep 2018 05:17) (103/66 - 107/66)  RR: 14 (22 Sep 2018 05:17) (14 - 14)  SpO2: 95% (22 Sep 2018 05:17) (95% - 96%)  I&O's Summary    21 Sep 2018 07:01  -  22 Sep 2018 07:00  --------------------------------------------------------  IN: 400 mL / OUT: 1301 mL / NET: -901 mL      PHYSICAL EXAM:  General: NAD, A/O x 3  ENT: MMM  Neck: Supple, No JVD  Lungs: Clear to auscultation bilaterally  Cardio: RRR, S1/S2, No murmurs  Abdomen: Soft, Nontender, Nondistended; Bowel sounds present  Extremities: No calf tenderness, No pitting edema    LABS:    RADIOLOGY & ADDITIONAL TESTS:    Care Discussed with Consultants/Other Providers:

## 2018-09-22 NOTE — PROGRESS NOTE ADULT - SUBJECTIVE AND OBJECTIVE BOX
This is a 62 y/o male with h/o HTN, gouty arthritis, lumbar disc disease, OA with L4-L5 spondylolisthesis with stenosis and radiculopathy s/p L4-5 laminectomy and decompression with transforaminal lumbar interbody fusion and placement of interbody cage and posterior spinal fusion L4-5 by Dr Orlnado Neville on 9/10/18 at [facility].       TODAYs SUBJECTIVE  Patient seen and examined in the AM. Feels better and less fatigued.        [X] Constiutional WNL            [X] Cardio WNL               [X] Resp WNL  [ ] GI-loose stool                       [X]  WNL                    [X] Heme WNL    Vital Signs Last 24 Hrs  T(C): 36.8 (21 Sep 2018 20:38), Max: 36.8 (21 Sep 2018 20:38)  T(F): 98.2 (21 Sep 2018 20:38), Max: 98.2 (21 Sep 2018 20:38)  HR: 74 (22 Sep 2018 05:17) (69 - 74)  BP: 107/66 (22 Sep 2018 05:17) (103/66 - 107/66)  BP(mean): --  RR: 14 (22 Sep 2018 05:17) (14 - 14)  SpO2: 95% (22 Sep 2018 05:17) (95% - 96%)      General: NAD, sitting in chair   Oral cavity: right lower gum molar area - swelling improved                                 Cardio: S1-S2,                           Pulm: Lungs CTAB                        Abdomen: no tenderness to palpation,                                                              Ext: no edema in LE   Wounds: Surgical incision on the lower back x 2 covered with tegaderm and telfa. on the left appears intact, much improved, no drainage however some irritation noted along edges of tegaderm   Decubitus Ulcers: None Present   Skin: LE with psoriatic lesions                              12.3   7.9   )-----------( 168      ( 19 Sep 2018 06:10 )             38.2       MEDICATIONS  (STANDING):  amLODIPine   Tablet 5 milliGRAM(s) Oral daily  BACItracin   Ointment 1 Application(s) Topical two times a day  clindamycin   Capsule 150 milliGRAM(s) Oral every 6 hours  docusate sodium 100 milliGRAM(s) Oral three times a day  febuxostat 40 milliGRAM(s) Oral daily  fluocinonide 0.05% Cream 1 Application(s) Topical every 12 hours  gabapentin 600 milliGRAM(s) Oral at bedtime  heparin  Injectable 5000 Unit(s) SubCutaneous every 8 hours  melatonin 3 milliGRAM(s) Oral at bedtime  multivitamin 1 Tablet(s) Oral daily  pantoprazole    Tablet 40 milliGRAM(s) Oral before breakfast  saccharomyces boulardii 250 milliGRAM(s) Oral two times a day  sertraline 25 milliGRAM(s) Oral daily  tamsulosin 0.4 milliGRAM(s) Oral at bedtime    MEDICATIONS  (PRN):  acetaminophen   Tablet .. 975 milliGRAM(s) Oral every 6 hours PRN Temp greater or equal to 38C (100.4F), Mild Pain (1 - 3), Moderate Pain (4 - 6)  FIRST- Mouthwash  BLM 15 milliLiter(s) Swish and Spit two times a day PRN Mouth Care  magnesium hydroxide Suspension 30 milliLiter(s) Oral daily PRN Constipation  oxyCODONE    IR 10 milliGRAM(s) Oral every 4 hours PRN Severe Pain (7 - 10)        Assessment/Plan    This is a 62 y/o male with h/o HTN, gouty arthritis, lumbar disc disease, OA with L4-L5 spondylolisthesis with stenosis and radiculopathy s/p L4-5 laminectomy and decompression with transforaminal lumbar interbody fusion and placement of interbody cage and posterior spinal fusion L4-5 with functional deficits including gait and ADL impairments with decreased endurance and strength.     COMORBIDITES/ACTIVE MEDICAL ISSUES     Gait Instability, ADL impairments and Functional impairments 2/2 recent [facility]: Comprehensive Rehab Program of PT/OT     Neuro/MSK:  -Pain control: Tylenol , Oxycodone PRN, gabapentin QHS- fatigue much improved with d/c of day time neurontin    Cardio:  -continue norvasc. Losartan - dose reduced 9/17 to 50mg and then dc on 9/18. BP still low normal    GI/Bowel Mgmt - d/c  amitiza. On Protonix for GI prophylaxis,  Off all bowel regimen as patient had large watery stool     #Renal/Lytes: Urology fu noted  -Bladder management:TOV 9/20, voiding after d/c of multani. continue flomax. D/C Bladder scans    #Heme/Onc:    DVT: SCD, TEDS,  heparin sq     #Skin:  -monitor surgical incision for drainage, Steroid cream for psoriatic  lesions    Dental abscess: On abx( clindamycin) with florostar. CBC Normal    Diet: regular with thin. Add MVI    Ill fitting brace: adjusted by PT    Precautions / PROPHYLAXIS:   - Falls, Spinal  - ortho: Weight bearing status: WBAT   - Lungs: Aspiration, Incentive Spirometer  - Pressure injury/Skin: Turn Q2hrs while in bed, OOB to Chair, PT/OT

## 2018-09-23 PROCEDURE — 99232 SBSQ HOSP IP/OBS MODERATE 35: CPT

## 2018-09-23 RX ORDER — POLYETHYLENE GLYCOL 3350 17 G/17G
17 POWDER, FOR SOLUTION ORAL DAILY
Qty: 0 | Refills: 0 | Status: DISCONTINUED | OUTPATIENT
Start: 2018-09-23 | End: 2018-09-27

## 2018-09-23 RX ADMIN — Medication 150 MILLIGRAM(S): at 17:33

## 2018-09-23 RX ADMIN — Medication 100 MILLIGRAM(S): at 22:28

## 2018-09-23 RX ADMIN — Medication 975 MILLIGRAM(S): at 17:35

## 2018-09-23 RX ADMIN — Medication 1 APPLICATION(S): at 17:34

## 2018-09-23 RX ADMIN — OXYCODONE HYDROCHLORIDE 10 MILLIGRAM(S): 5 TABLET ORAL at 18:38

## 2018-09-23 RX ADMIN — POLYETHYLENE GLYCOL 3350 17 GRAM(S): 17 POWDER, FOR SOLUTION ORAL at 13:09

## 2018-09-23 RX ADMIN — Medication 250 MILLIGRAM(S): at 05:51

## 2018-09-23 RX ADMIN — OXYCODONE HYDROCHLORIDE 10 MILLIGRAM(S): 5 TABLET ORAL at 13:05

## 2018-09-23 RX ADMIN — OXYCODONE HYDROCHLORIDE 10 MILLIGRAM(S): 5 TABLET ORAL at 14:20

## 2018-09-23 RX ADMIN — Medication 150 MILLIGRAM(S): at 00:12

## 2018-09-23 RX ADMIN — GABAPENTIN 600 MILLIGRAM(S): 400 CAPSULE ORAL at 22:28

## 2018-09-23 RX ADMIN — HEPARIN SODIUM 5000 UNIT(S): 5000 INJECTION INTRAVENOUS; SUBCUTANEOUS at 05:52

## 2018-09-23 RX ADMIN — FEBUXOSTAT 40 MILLIGRAM(S): 40 TABLET ORAL at 13:10

## 2018-09-23 RX ADMIN — Medication 975 MILLIGRAM(S): at 13:08

## 2018-09-23 RX ADMIN — Medication 150 MILLIGRAM(S): at 22:28

## 2018-09-23 RX ADMIN — Medication 1 TABLET(S): at 13:09

## 2018-09-23 RX ADMIN — OXYCODONE HYDROCHLORIDE 10 MILLIGRAM(S): 5 TABLET ORAL at 06:49

## 2018-09-23 RX ADMIN — Medication 3 MILLIGRAM(S): at 22:28

## 2018-09-23 RX ADMIN — Medication 1 APPLICATION(S): at 05:52

## 2018-09-23 RX ADMIN — Medication 150 MILLIGRAM(S): at 05:51

## 2018-09-23 RX ADMIN — Medication 100 MILLIGRAM(S): at 05:51

## 2018-09-23 RX ADMIN — Medication 150 MILLIGRAM(S): at 13:10

## 2018-09-23 RX ADMIN — PANTOPRAZOLE SODIUM 40 MILLIGRAM(S): 20 TABLET, DELAYED RELEASE ORAL at 05:52

## 2018-09-23 RX ADMIN — TAMSULOSIN HYDROCHLORIDE 0.4 MILLIGRAM(S): 0.4 CAPSULE ORAL at 22:28

## 2018-09-23 RX ADMIN — Medication 1 APPLICATION(S): at 05:53

## 2018-09-23 RX ADMIN — OXYCODONE HYDROCHLORIDE 10 MILLIGRAM(S): 5 TABLET ORAL at 02:34

## 2018-09-23 RX ADMIN — OXYCODONE HYDROCHLORIDE 10 MILLIGRAM(S): 5 TABLET ORAL at 06:19

## 2018-09-23 RX ADMIN — Medication 250 MILLIGRAM(S): at 17:33

## 2018-09-23 RX ADMIN — Medication 100 MILLIGRAM(S): at 13:09

## 2018-09-23 RX ADMIN — HEPARIN SODIUM 5000 UNIT(S): 5000 INJECTION INTRAVENOUS; SUBCUTANEOUS at 13:11

## 2018-09-23 RX ADMIN — HEPARIN SODIUM 5000 UNIT(S): 5000 INJECTION INTRAVENOUS; SUBCUTANEOUS at 22:29

## 2018-09-23 RX ADMIN — OXYCODONE HYDROCHLORIDE 10 MILLIGRAM(S): 5 TABLET ORAL at 10:11

## 2018-09-23 RX ADMIN — SERTRALINE 25 MILLIGRAM(S): 25 TABLET, FILM COATED ORAL at 13:10

## 2018-09-23 RX ADMIN — OXYCODONE HYDROCHLORIDE 10 MILLIGRAM(S): 5 TABLET ORAL at 02:04

## 2018-09-23 RX ADMIN — OXYCODONE HYDROCHLORIDE 10 MILLIGRAM(S): 5 TABLET ORAL at 17:36

## 2018-09-23 NOTE — PROGRESS NOTE ADULT - SUBJECTIVE AND OBJECTIVE BOX
This is a 64 y/o male with h/o HTN, gouty arthritis, lumbar disc disease, OA with L4-L5 spondylolisthesis with stenosis and radiculopathy s/p L4-5 laminectomy and decompression with transforaminal lumbar interbody fusion and placement of interbody cage and posterior spinal fusion L4-5 by Dr Orlando Neville on 9/10/18 at [facility].       TODAYs SUBJECTIVE  Patient seen and examined in the AM. Had left hamstring area pain overnight, that was relieved with moist heat and pain med.        [X] Constiutional WNL            [X] Cardio WNL               [X] Resp WNL  [ ] GI- feels somewhat constipated                     [X]  WNL                    [X] Heme WNL    Vital Signs Last 24 Hrs  T(C): 36.7 (22 Sep 2018 19:44), Max: 36.7 (22 Sep 2018 19:44)  T(F): 98.1 (22 Sep 2018 19:44), Max: 98.1 (22 Sep 2018 19:44)  HR: 78 (23 Sep 2018 05:50) (71 - 78)  BP: 107/64 (23 Sep 2018 05:50) (107/64 - 122/75)  BP(mean): --  RR: 14 (23 Sep 2018 05:50) (14 - 14)  SpO2: 95% (23 Sep 2018 05:50) (95% - 97%)      General: NAD, sitting in chair   Oral cavity: right lower gum molar area - swelling improved                                 Cardio: S1-S2,                           Pulm: Lungs CTAB                        Abdomen: no tenderness to palpation,                                           Ext: no edema in LE   Wounds: Surgical incision on the lower back x 2 covered with tegaderm and telfa. no drainage however some irritation noted along edges of tegaderm   Skin: LE with psoriatic lesions                              12.3   7.9   )-----------( 168      ( 19 Sep 2018 06:10 )             38.2       MEDICATIONS  (STANDING):  amLODIPine   Tablet 5 milliGRAM(s) Oral daily  BACItracin   Ointment 1 Application(s) Topical two times a day  clindamycin   Capsule 150 milliGRAM(s) Oral every 6 hours  docusate sodium 100 milliGRAM(s) Oral three times a day  febuxostat 40 milliGRAM(s) Oral daily  fluocinonide 0.05% Cream 1 Application(s) Topical every 12 hours  gabapentin 600 milliGRAM(s) Oral at bedtime  heparin  Injectable 5000 Unit(s) SubCutaneous every 8 hours  melatonin 3 milliGRAM(s) Oral at bedtime  multivitamin 1 Tablet(s) Oral daily  pantoprazole    Tablet 40 milliGRAM(s) Oral before breakfast  saccharomyces boulardii 250 milliGRAM(s) Oral two times a day  sertraline 25 milliGRAM(s) Oral daily  tamsulosin 0.4 milliGRAM(s) Oral at bedtime    MEDICATIONS  (PRN):  acetaminophen   Tablet .. 975 milliGRAM(s) Oral every 6 hours PRN Temp greater or equal to 38C (100.4F), Mild Pain (1 - 3), Moderate Pain (4 - 6)  FIRST- Mouthwash  BLM 15 milliLiter(s) Swish and Spit two times a day PRN Mouth Care  magnesium hydroxide Suspension 30 milliLiter(s) Oral daily PRN Constipation  oxyCODONE    IR 10 milliGRAM(s) Oral every 4 hours PRN Severe Pain (7 - 10)        Assessment/Plan    This is a 64 y/o male with h/o HTN, gouty arthritis, lumbar disc disease, OA with L4-L5 spondylolisthesis with stenosis and radiculopathy s/p L4-5 laminectomy and decompression with transforaminal lumbar interbody fusion and placement of interbody cage and posterior spinal fusion L4-5 with functional deficits including gait and ADL impairments with decreased endurance and strength.     COMORBIDITES/ACTIVE MEDICAL ISSUES     Gait Instability, ADL impairments and Functional impairments 2/2 recent [facility]: [facility] of PT/OT     Neuro/MSK:  -Pain control: Tylenol , Oxycodone PRN, gabapentin QHS- fatigue much improved with d/c of day time neurontin    Cardio:  -continue norvasc. Losartan - dose reduced 9/17 to 50mg and then dc on 9/18. BP still low normal    GI/Bowel Mgmt - On Protonix for GI prophylaxis,  Off all bowel regimen as patient had large watery stool - continue colace and add miralax prn    #Renal/Lytes: Urology fu noted  -Bladder management:TOV 9/20, voiding after d/c of multani. continue flomax. D/C Bladder scans    DVT: SCD, TEDS,  heparin sq     #Skin:  -monitor surgical incision for drainage, Steroid cream for psoriatic  lesions    Dental abscess: On abx( clindamycin) till 9/25 with florostar. CBC Normal    Diet: regular with thin. Add MVI    Ill fitting brace: adjusted by PT    Precautions / PROPHYLAXIS:   - Falls, Spinal  - ortho: Weight bearing status: WBAT   - Lungs: Aspiration, Incentive Spirometer  - Pressure injury/Skin: Turn Q2hrs while in bed, OOB to Chair, PT/OT This is a 62 y/o male with h/o HTN, gouty arthritis, lumbar disc disease, OA with L4-L5 spondylolisthesis with stenosis and radiculopathy s/p L4-5 laminectomy and decompression with transforaminal lumbar interbody fusion and placement of interbody cage and posterior spinal fusion L4-5 by Dr Orlando Neville on 9/10/18 at [facility].       TODAYs SUBJECTIVE  Patient seen and examined in the AM. Had left hamstring area pain overnight, that was relieved with moist heat and pain med.        [X] Constiutional WNL            [X] Cardio WNL               [X] Resp WNL  [ ] GI- feels somewhat constipated                     [X]  WNL                    [X] Heme WNL    Vital Signs Last 24 Hrs  T(C): 36.7 (22 Sep 2018 19:44), Max: 36.7 (22 Sep 2018 19:44)  T(F): 98.1 (22 Sep 2018 19:44), Max: 98.1 (22 Sep 2018 19:44)  HR: 78 (23 Sep 2018 05:50) (71 - 78)  BP: 107/64 (23 Sep 2018 05:50) (107/64 - 122/75)  BP(mean): --  RR: 14 (23 Sep 2018 05:50) (14 - 14)  SpO2: 95% (23 Sep 2018 05:50) (95% - 97%)      General: NAD, sitting in chair   Oral cavity: right lower gum molar area - swelling improved                                 Cardio: S1-S2,                           Pulm: Lungs CTAB                        Abdomen: no tenderness to palpation,                                           Ext: no edema in LE   Wounds: Surgical incision on the lower back x 2 covered with tegaderm and telfa. no drainage however some irritation noted along edges of tegaderm   Skin: LE with psoriatic lesions                              12.3   7.9   )-----------( 168      ( 19 Sep 2018 06:10 )             38.2       MEDICATIONS  (STANDING):  amLODIPine   Tablet 5 milliGRAM(s) Oral daily  BACItracin   Ointment 1 Application(s) Topical two times a day  clindamycin   Capsule 150 milliGRAM(s) Oral every 6 hours  docusate sodium 100 milliGRAM(s) Oral three times a day  febuxostat 40 milliGRAM(s) Oral daily  fluocinonide 0.05% Cream 1 Application(s) Topical every 12 hours  gabapentin 600 milliGRAM(s) Oral at bedtime  heparin  Injectable 5000 Unit(s) SubCutaneous every 8 hours  melatonin 3 milliGRAM(s) Oral at bedtime  multivitamin 1 Tablet(s) Oral daily  pantoprazole    Tablet 40 milliGRAM(s) Oral before breakfast  saccharomyces boulardii 250 milliGRAM(s) Oral two times a day  sertraline 25 milliGRAM(s) Oral daily  tamsulosin 0.4 milliGRAM(s) Oral at bedtime    MEDICATIONS  (PRN):  acetaminophen   Tablet .. 975 milliGRAM(s) Oral every 6 hours PRN Temp greater or equal to 38C (100.4F), Mild Pain (1 - 3), Moderate Pain (4 - 6)  FIRST- Mouthwash  BLM 15 milliLiter(s) Swish and Spit two times a day PRN Mouth Care  magnesium hydroxide Suspension 30 milliLiter(s) Oral daily PRN Constipation  oxyCODONE    IR 10 milliGRAM(s) Oral every 4 hours PRN Severe Pain (7 - 10)        Assessment/Plan    This is a 62 y/o male with h/o HTN, gouty arthritis, lumbar disc disease, OA with L4-L5 spondylolisthesis with stenosis and radiculopathy s/p L4-5 laminectomy and decompression with transforaminal lumbar interbody fusion and placement of interbody cage and posterior spinal fusion L4-5 with functional deficits including gait and ADL impairments with decreased endurance and strength.     COMORBIDITES/ACTIVE MEDICAL ISSUES     Gait Instability, ADL impairments and Functional impairments 2/2 recent [facility]: Comprehensive Rehab Program of PT/OT     Neuro/MSK:  -Pain control: Tylenol , Oxycodone PRN, gabapentin QHS- fatigue much improved with d/c of day time neurontin    Cardio:  -continue norvasc. Losartan - dose reduced 9/17 to 50mg and then dc on 9/18. BP still low normal    GI/Bowel Mgmt - On Protonix for GI prophylaxis,  Off all bowel regimen as patient had large watery stool - continue colace and add miralax    #Renal/Lytes: Urology fu noted  -Bladder management:TOV 9/20, voiding after d/c of multani. continue flomax. D/C Bladder scans    DVT: SCD, TEDS,  heparin sq     #Skin:  -monitor surgical incision for drainage, Steroid cream for psoriatic  lesions    Dental abscess: On abx( clindamycin) till 9/25 with florostar. CBC Normal    Diet: regular with thin. Add MVI    Ill fitting brace: adjusted by PT    Precautions / PROPHYLAXIS:   - Falls, Spinal  - ortho: Weight bearing status: WBAT   - Lungs: Aspiration, Incentive Spirometer  - Pressure injury/Skin: Turn Q2hrs while in bed, OOB to Chair, PT/OT

## 2018-09-24 ENCOUNTER — TRANSCRIPTION ENCOUNTER (OUTPATIENT)
Age: 63
End: 2018-09-24

## 2018-09-24 LAB
ANION GAP SERPL CALC-SCNC: 8 MMOL/L — SIGNIFICANT CHANGE UP (ref 5–17)
BUN SERPL-MCNC: 30 MG/DL — HIGH (ref 7–23)
CALCIUM SERPL-MCNC: 9 MG/DL — SIGNIFICANT CHANGE UP (ref 8.4–10.5)
CHLORIDE SERPL-SCNC: 99 MMOL/L — SIGNIFICANT CHANGE UP (ref 96–108)
CO2 SERPL-SCNC: 28 MMOL/L — SIGNIFICANT CHANGE UP (ref 22–31)
CREAT SERPL-MCNC: 1.61 MG/DL — HIGH (ref 0.5–1.3)
GLUCOSE SERPL-MCNC: 101 MG/DL — HIGH (ref 70–99)
HCT VFR BLD CALC: 42 % — SIGNIFICANT CHANGE UP (ref 39–50)
HGB BLD-MCNC: 13.8 G/DL — SIGNIFICANT CHANGE UP (ref 13–17)
MCHC RBC-ENTMCNC: 30.6 PG — SIGNIFICANT CHANGE UP (ref 27–34)
MCHC RBC-ENTMCNC: 32.7 GM/DL — SIGNIFICANT CHANGE UP (ref 32–36)
MCV RBC AUTO: 93.4 FL — SIGNIFICANT CHANGE UP (ref 80–100)
PLATELET # BLD AUTO: 224 K/UL — SIGNIFICANT CHANGE UP (ref 150–400)
POTASSIUM SERPL-MCNC: 5 MMOL/L — SIGNIFICANT CHANGE UP (ref 3.5–5.3)
POTASSIUM SERPL-SCNC: 5 MMOL/L — SIGNIFICANT CHANGE UP (ref 3.5–5.3)
RBC # BLD: 4.5 M/UL — SIGNIFICANT CHANGE UP (ref 4.2–5.8)
RBC # FLD: 11.8 % — SIGNIFICANT CHANGE UP (ref 10.3–14.5)
SODIUM SERPL-SCNC: 135 MMOL/L — SIGNIFICANT CHANGE UP (ref 135–145)
WBC # BLD: 7.1 K/UL — SIGNIFICANT CHANGE UP (ref 3.8–10.5)
WBC # FLD AUTO: 7.1 K/UL — SIGNIFICANT CHANGE UP (ref 3.8–10.5)

## 2018-09-24 PROCEDURE — 99232 SBSQ HOSP IP/OBS MODERATE 35: CPT

## 2018-09-24 RX ORDER — DOCUSATE SODIUM 100 MG
1 CAPSULE ORAL
Qty: 0 | Refills: 0 | COMMUNITY
Start: 2018-09-24

## 2018-09-24 RX ORDER — FLUOCINONIDE/EMOLLIENT BASE 0.05 %
1 CREAM (GRAM) TOPICAL
Qty: 0 | Refills: 0 | COMMUNITY
Start: 2018-09-24

## 2018-09-24 RX ORDER — POLYETHYLENE GLYCOL 3350 17 G/17G
17 POWDER, FOR SOLUTION ORAL
Qty: 0 | Refills: 0 | COMMUNITY
Start: 2018-09-24

## 2018-09-24 RX ORDER — PANTOPRAZOLE SODIUM 20 MG/1
1 TABLET, DELAYED RELEASE ORAL
Qty: 0 | Refills: 0 | COMMUNITY
Start: 2018-09-24

## 2018-09-24 RX ORDER — ACETAMINOPHEN 500 MG
3 TABLET ORAL
Qty: 0 | Refills: 0 | COMMUNITY
Start: 2018-09-24

## 2018-09-24 RX ORDER — LANOLIN ALCOHOL/MO/W.PET/CERES
1 CREAM (GRAM) TOPICAL
Qty: 0 | Refills: 0 | COMMUNITY
Start: 2018-09-24

## 2018-09-24 RX ADMIN — Medication 1 TABLET(S): at 12:50

## 2018-09-24 RX ADMIN — Medication 1 APPLICATION(S): at 06:02

## 2018-09-24 RX ADMIN — Medication 150 MILLIGRAM(S): at 18:04

## 2018-09-24 RX ADMIN — Medication 100 MILLIGRAM(S): at 18:04

## 2018-09-24 RX ADMIN — Medication 100 MILLIGRAM(S): at 06:02

## 2018-09-24 RX ADMIN — Medication 3 MILLIGRAM(S): at 22:18

## 2018-09-24 RX ADMIN — Medication 1 APPLICATION(S): at 18:06

## 2018-09-24 RX ADMIN — TAMSULOSIN HYDROCHLORIDE 0.4 MILLIGRAM(S): 0.4 CAPSULE ORAL at 22:18

## 2018-09-24 RX ADMIN — Medication 150 MILLIGRAM(S): at 23:28

## 2018-09-24 RX ADMIN — Medication 100 MILLIGRAM(S): at 22:18

## 2018-09-24 RX ADMIN — OXYCODONE HYDROCHLORIDE 10 MILLIGRAM(S): 5 TABLET ORAL at 14:01

## 2018-09-24 RX ADMIN — OXYCODONE HYDROCHLORIDE 10 MILLIGRAM(S): 5 TABLET ORAL at 23:30

## 2018-09-24 RX ADMIN — OXYCODONE HYDROCHLORIDE 10 MILLIGRAM(S): 5 TABLET ORAL at 23:28

## 2018-09-24 RX ADMIN — Medication 150 MILLIGRAM(S): at 12:50

## 2018-09-24 RX ADMIN — POLYETHYLENE GLYCOL 3350 17 GRAM(S): 17 POWDER, FOR SOLUTION ORAL at 12:50

## 2018-09-24 RX ADMIN — HEPARIN SODIUM 5000 UNIT(S): 5000 INJECTION INTRAVENOUS; SUBCUTANEOUS at 06:01

## 2018-09-24 RX ADMIN — Medication 250 MILLIGRAM(S): at 06:02

## 2018-09-24 RX ADMIN — OXYCODONE HYDROCHLORIDE 10 MILLIGRAM(S): 5 TABLET ORAL at 10:09

## 2018-09-24 RX ADMIN — FEBUXOSTAT 40 MILLIGRAM(S): 40 TABLET ORAL at 12:50

## 2018-09-24 RX ADMIN — SERTRALINE 25 MILLIGRAM(S): 25 TABLET, FILM COATED ORAL at 12:50

## 2018-09-24 RX ADMIN — PANTOPRAZOLE SODIUM 40 MILLIGRAM(S): 20 TABLET, DELAYED RELEASE ORAL at 06:02

## 2018-09-24 RX ADMIN — Medication 150 MILLIGRAM(S): at 06:02

## 2018-09-24 RX ADMIN — GABAPENTIN 600 MILLIGRAM(S): 400 CAPSULE ORAL at 22:18

## 2018-09-24 RX ADMIN — HEPARIN SODIUM 5000 UNIT(S): 5000 INJECTION INTRAVENOUS; SUBCUTANEOUS at 18:05

## 2018-09-24 RX ADMIN — Medication 1 APPLICATION(S): at 22:17

## 2018-09-24 RX ADMIN — HEPARIN SODIUM 5000 UNIT(S): 5000 INJECTION INTRAVENOUS; SUBCUTANEOUS at 22:18

## 2018-09-24 RX ADMIN — Medication 250 MILLIGRAM(S): at 18:06

## 2018-09-24 NOTE — DISCHARGE NOTE ADULT - CARE PROVIDER_API CALL
Jamaal Dwyer  Phone: [phone]  Fax: (   )    -    Orlando Neville  Phone: [phone]  Fax: (   )    - Orlando Neville  Phone: [phone]  Fax: (   )    -    Jamaal Dwyer  Phone: [phone]  Fax: (   )    -

## 2018-09-24 NOTE — DISCHARGE NOTE ADULT - PLAN OF CARE
s/p transforaminal, L4-5, lumbar interbody fusion 9/10 Follow up with your orthopedic surgeon within one week of discharge for evaluation for removal of your surgical sutures. Dress your wound daily with dry dressing: with 4x4 pad and Tegaderm cover. If you notice any signs of continuous large bloody discharge, fever, chills, please present to your nearest ED for further evaluation. Continue to wear brace. This was treated and improved with antibiotics. Follow up with your dentist as outpatient. Resolved after trial of void Your blood pressures remained normal while inpatient and did not require your home blood pressure medication. Stop use of norvasc. Continue to measure blood pressure at home. Follow up within one week with your PCP to discuss if you should resume you blood pressure medication.

## 2018-09-24 NOTE — PROGRESS NOTE ADULT - SUBJECTIVE AND OBJECTIVE BOX
This is a 64 y/o male with h/o HTN, gouty arthritis, lumbar disc disease, OA with L4-L5 spondylolisthesis with stenosis and radiculopathy s/p L4-5 laminectomy and decompression with transforaminal lumbar interbody fusion and placement of interbody cage and posterior spinal fusion L4-5 by Dr Orlando Neville on 9/10/18 at [facility].       TODAYs SUBJECTIVE  Patient seen and examined in the AM. No further pain         [X] Constiutional WNL            [X] Cardio WNL               [X] Resp WNL  [ ] GI- feels somewhat constipated                     [X]  WNL                    [X] Heme WNL    Vital Signs Last 24 Hrs  T(C): 36.6 (24 Sep 2018 08:00), Max: 36.6 (23 Sep 2018 14:16)  T(F): 97.8 (24 Sep 2018 08:00), Max: 97.9 (23 Sep 2018 14:16)  HR: 80 (24 Sep 2018 08:00) (73 - 80)  BP: 104/68 (24 Sep 2018 08:00) (104/68 - 118/76)  BP(mean): --  RR: 14 (24 Sep 2018 08:00) (14 - 14)  SpO2: 96% (24 Sep 2018 08:00) (95% - 96%)      General: NAD, sitting in chair   Oral cavity: right lower gum molar area - swelling improved                                 Cardio: S1-S2,                           Pulm: Lungs CTAB                        Abdomen: no tenderness to palpation,                                           Ext: no edema in LE   Wounds: Surgical incision on the lower back x 2 covered with tegaderm and telfa. no drainage however some irritation noted along edges of tegaderm   Skin: LE with psoriatic lesions                            13.8   7.1   )-----------( 224      ( 24 Sep 2018 05:40 )             42.0     09-24    135  |  99  |  30<H>  ----------------------------<  101<H>  5.0   |  28  |  1.61<H>    Ca    9.0      24 Sep 2018 05:40    MEDICATIONS  (STANDING):  amLODIPine   Tablet 5 milliGRAM(s) Oral daily  BACItracin   Ointment 1 Application(s) Topical two times a day  clindamycin   Capsule 150 milliGRAM(s) Oral every 6 hours  docusate sodium 100 milliGRAM(s) Oral three times a day  febuxostat 40 milliGRAM(s) Oral daily  fluocinonide 0.05% Cream 1 Application(s) Topical every 12 hours  gabapentin 600 milliGRAM(s) Oral at bedtime  heparin  Injectable 5000 Unit(s) SubCutaneous every 8 hours  melatonin 3 milliGRAM(s) Oral at bedtime  multivitamin 1 Tablet(s) Oral daily  pantoprazole    Tablet 40 milliGRAM(s) Oral before breakfast  polyethylene glycol 3350 17 Gram(s) Oral daily  saccharomyces boulardii 250 milliGRAM(s) Oral two times a day  sertraline 25 milliGRAM(s) Oral daily  tamsulosin 0.4 milliGRAM(s) Oral at bedtime    MEDICATIONS  (PRN):  acetaminophen   Tablet .. 975 milliGRAM(s) Oral every 6 hours PRN Temp greater or equal to 38C (100.4F), Mild Pain (1 - 3), Moderate Pain (4 - 6)  FIRST- Mouthwash  BLM 15 milliLiter(s) Swish and Spit two times a day PRN Mouth Care  magnesium hydroxide Suspension 30 milliLiter(s) Oral daily PRN Constipation  oxyCODONE    IR 10 milliGRAM(s) Oral every 4 hours PRN Severe Pain (7 - 10)                Assessment/Plan    This is a 64 y/o male with h/o HTN, gouty arthritis, lumbar disc disease, OA with L4-L5 spondylolisthesis with stenosis and radiculopathy s/p L4-5 laminectomy and decompression with transforaminal lumbar interbody fusion and placement of interbody cage and posterior spinal fusion L4-5 with functional deficits including gait and ADL impairments with decreased endurance and strength.     COMORBIDITES/ACTIVE MEDICAL ISSUES     Gait Instability, ADL impairments and Functional impairments 2/2 recent [facility]: Comprehensive Rehab Program of PT/OT     Neuro/MSK:  -Pain control: Tylenol , Oxycodone PRN, gabapentin QHS- fatigue much improved with d/c of day time neurontin    Cardio:-continue norvasc. (Losartan - dose reduced 9/17 to 50mg and then dc on 9/18. )BP still low normal    GI/Bowel Mgmt - On Protonix for GI prophylaxis,  Off all bowel regimen as patient had large watery stool - continue colace and add miralax    #Renal/Lytes: Bladder management:TOV 9/20, voiding after d/c of multani. continue flomax. D/C Bladder scans. Urology fu noted    DVT: SCD, TEDS,  heparin sq     Skin:-monitor surgical incision for drainage, Steroid cream for psoriatic  lesions    Dental abscess: On abx( clindamycin) till 9/25 with florostar.    Diet: regular with thin. MVI    Ill fitting brace: adjusted by PT    Mild GODFREY: encourage PO fluids. Repeat in am     Precautions / PROPHYLAXIS:   - Falls, Spinal  - ortho: Weight bearing status: WBAT   - Lungs: Aspiration, Incentive Spirometer  - Pressure injury/Skin: Turn Q2hrs while in bed, OOB to Chair, PT/OT

## 2018-09-24 NOTE — DISCHARGE NOTE ADULT - PROVIDER TOKENS
FREE:[LAST:[DiScala],FIRST:[Jamaal],PHONE:[phone]],FAX:[(   )    -]],FREE:[LAST:[Kelin],FIRST:[Orlando],PHONE:[phone]:[(   )    -]] FREE:[LAST:[Kelin],FIRST:[Orlando],PHONE:[phone]],FAX:[(   )    -]],FREE:[LAST:[Yuliya],FIRST:[Jamaal],PHONE:[phone]],FAX:[(   )    -]]

## 2018-09-24 NOTE — DISCHARGE NOTE ADULT - ADDITIONAL INSTRUCTIONS
Follow up with your Orthopedic surgeon within one week of discharge. Follow up with your primary care physician and dentist after discharge. Follow up with your Orthopedic surgeon within one week of discharge. Follow up with your primary care physician and dentist within one week after discharge.

## 2018-09-24 NOTE — DISCHARGE NOTE ADULT - PATIENT PORTAL LINK FT
You can access the [facility] Patient Portal, offered by [facility], by registering with the following website: [email or web address]

## 2018-09-24 NOTE — PROGRESS NOTE ADULT - SUBJECTIVE AND OBJECTIVE BOX
Patient is a 63y old  Male who presents with a chief complaint of s/p transforaminal, L4-5, lumbar interbody fusion (23 Sep 2018 10:16)    HPI:  This is a 62 y/o male with h/o HTN, gouty arthritis, lumbar disc disease, OA with L4-L5 spondylolisthesis with stenosis and radiculopathy s/p L4-5 laminectomy and decompression with transforaminal lumbar interbody fusion and placement of interbody cage and posterior spinal fusion L4-5 by Dr Orlando Neville on 9/10/18 at [facility].     Patient to continue use LSO and is full WBAT. See by physical and ocupational therapists. Pt is ambulating 30 ft with RW and min to mod A, min A for UE dressing, max A for LE dressing,     passed voiding trial on flomax         Interval Events:  Patient seen and examined at bedside.    MEDICATIONS:  MEDICATIONS  (STANDING):  amLODIPine   Tablet 5 milliGRAM(s) Oral daily  BACItracin   Ointment 1 Application(s) Topical two times a day  clindamycin   Capsule 150 milliGRAM(s) Oral every 6 hours  docusate sodium 100 milliGRAM(s) Oral three times a day  febuxostat 40 milliGRAM(s) Oral daily  fluocinonide 0.05% Cream 1 Application(s) Topical every 12 hours  gabapentin 600 milliGRAM(s) Oral at bedtime  heparin  Injectable 5000 Unit(s) SubCutaneous every 8 hours  melatonin 3 milliGRAM(s) Oral at bedtime  multivitamin 1 Tablet(s) Oral daily  pantoprazole    Tablet 40 milliGRAM(s) Oral before breakfast  polyethylene glycol 3350 17 Gram(s) Oral daily  saccharomyces boulardii 250 milliGRAM(s) Oral two times a day  sertraline 25 milliGRAM(s) Oral daily  tamsulosin 0.4 milliGRAM(s) Oral at bedtime    MEDICATIONS  (PRN):  acetaminophen   Tablet .. 975 milliGRAM(s) Oral every 6 hours PRN Temp greater or equal to 38C (100.4F), Mild Pain (1 - 3), Moderate Pain (4 - 6)  FIRST- Mouthwash  BLM 15 milliLiter(s) Swish and Spit two times a day PRN Mouth Care  magnesium hydroxide Suspension 30 milliLiter(s) Oral daily PRN Constipation  oxyCODONE    IR 10 milliGRAM(s) Oral every 4 hours PRN Severe Pain (7 - 10)      Allergies    No Known Allergies    Intolerances        T(C): 36.6 (09-23-18 @ 14:16), Max: 36.7 (09-22-18 @ 19:44)  T(F): 97.9 (09-23-18 @ 14:16), Max: 98.1 (09-22-18 @ 19:44)  HR: 73 (09-23-18 @ 22:25) (71 - 79)  BP: 110/76 (09-24-18 @ 06:00) (107/64 - 122/75)  RR: 14 (09-23-18 @ 22:25) (14 - 14)  SpO2: 95% (09-23-18 @ 22:25) (95% - 97%)              LABS:      CBC Full  -  ( 24 Sep 2018 05:40 )  WBC Count : 7.1 K/uL  Hemoglobin : 13.8 g/dL  Hematocrit : 42.0 %  Platelet Count - Automated : 224 K/uL  Mean Cell Volume : 93.4 fl  Mean Cell Hemoglobin : 30.6 pg  Mean Cell Hemoglobin Concentration : 32.7 gm/dL  Auto Neutrophil # : x  Auto Lymphocyte # : x  Auto Monocyte # : x  Auto Eosinophil # : x  Auto Basophil # : x  Auto Neutrophil % : x  Auto Lymphocyte % : x  Auto Monocyte % : x  Auto Eosinophil % : x  Auto Basophil % : x    09-24    135  |  99  |  30<H>  ----------------------------<  101<H>  5.0   |  28  |  1.61<H>    Ca    9.0      24 Sep 2018 05:40                    Physical Exam    Constitutional: alert, no acute distress    Abdomen: soft, nontender, nondistended, no HSM    Genitourinary: no bladder distention

## 2018-09-24 NOTE — DISCHARGE NOTE ADULT - HOSPITAL COURSE
64 y/o male with h/o HTN, gouty arthritis, lumbar disc disease, OA with L4-L5 spondylolisthesis with stenosis and radiculopathy s/p L4-5 laminectomy and decompression with transforaminal lumbar interbody fusion and placement of interbody cage and posterior spinal fusion L4-5 by Dr Orlando Neville on 9/10/18 at [facility]. Patient to continue use LSO and is full WBAT. See by physical and ocupational therapists. Pt is ambulating 30 ft with RW and min to mod A, min A for UE dressing, max A for LE dressing, Indwelling multani catheter removed 9/11, passed TOV. patient was stable for discharge to Acute inpatient Rehab  ACUTE INPATIENT REHAB COURSE:  Patient was unable to void on presentation requiring replacement of multani. He states that he has not had bowel movement in several days, failing medical management. Rectal disimpaction was attemepted but failed to relieve constipation. GI was consulted and patient was started on amitiza for opoid induced constipation with resultant improvement in bowel movements. Urology was consulted and repeat TOV was successfull with patient subsequently independent with voiding. Dentistry was consulted for dental abscess on the right for whcih he completed course of clindamycin with improvement in symptoms. Patient wound was monitored daily and remained stable. Heparin was restarted for dvt ppx after discussing with his surgeon (Dr. Neville). Patient had states that his brace was too small but after discussion with out PT as well as PT from where patient was discharge, brace was deemed was appropriately sized for patient.   Patient was stable for discharge with appropriate follow up.

## 2018-09-24 NOTE — DISCHARGE NOTE ADULT - CARE PLAN
Principal Discharge DX:	Spinal stenosis  Goal:	s/p transforaminal, L4-5, lumbar interbody fusion 9/10  Assessment and plan of treatment:	Follow up with your orthopedic surgeon within one week of discharge for evaluation for removal of your surgical sutures. Dress your wound daily with dry dressing: with 4x4 pad and Tegaderm cover. If you notice any signs of continuous large bloody discharge, fever, chills, please present to your nearest ED for further evaluation. Continue to wear brace.  Secondary Diagnosis:	Tooth abscess  Assessment and plan of treatment:	This was treated and improved with antibiotics. Follow up with your dentist as outpatient. Principal Discharge DX:	Spinal stenosis  Goal:	s/p transforaminal, L4-5, lumbar interbody fusion 9/10  Assessment and plan of treatment:	Follow up with your orthopedic surgeon within one week of discharge for evaluation for removal of your surgical sutures. Dress your wound daily with dry dressing: with 4x4 pad and Tegaderm cover. If you notice any signs of continuous large bloody discharge, fever, chills, please present to your nearest ED for further evaluation. Continue to wear brace.  Secondary Diagnosis:	Tooth abscess  Assessment and plan of treatment:	This was treated and improved with antibiotics. Follow up with your dentist as outpatient.  Secondary Diagnosis:	Urinary retention  Assessment and plan of treatment:	Resolved after trial of void Principal Discharge DX:	Spinal stenosis  Goal:	s/p transforaminal, L4-5, lumbar interbody fusion 9/10  Assessment and plan of treatment:	Follow up with your orthopedic surgeon within one week of discharge for evaluation for removal of your surgical sutures. Dress your wound daily with dry dressing: with 4x4 pad and Tegaderm cover. If you notice any signs of continuous large bloody discharge, fever, chills, please present to your nearest ED for further evaluation. Continue to wear brace.  Secondary Diagnosis:	Tooth abscess  Assessment and plan of treatment:	This was treated and improved with antibiotics. Follow up with your dentist as outpatient.  Secondary Diagnosis:	Urinary retention  Assessment and plan of treatment:	Resolved after trial of void  Secondary Diagnosis:	Hypertension  Assessment and plan of treatment:	Your blood pressures remained normal while inpatient and did not require your home blood pressure medication. Stop use of norvasc. Continue to measure blood pressure at home. Follow up within one week with your PCP to discuss if you should resume you blood pressure medication.

## 2018-09-24 NOTE — DISCHARGE NOTE ADULT - MEDICATION SUMMARY - MEDICATIONS TO TAKE
I will START or STAY ON the medications listed below when I get home from the hospital:    acetaminophen 325 mg oral tablet  -- 1 tab(s) by mouth every 6 hours, As Needed -Temp greater or equal to 38C (100.4F), Mild Pain (1 - 3), Moderate Pain (4 - 6)   -- Indication: For Pain    oxyCODONE 10 mg oral tablet  -- 1 tab(s) by mouth every 6 hours, As Needed -Severe Pain (7 - 10) MDD:4 times/day  -- Indication: For For severe Pain    gabapentin 300 mg oral capsule  -- 2 cap(s) by mouth once a day (at bedtime)  -- Indication: For Neuropathic pain    sertraline 25 mg oral tablet  -- 1 tab(s) by mouth once a day  -- Indication: For Neuropathic pain    febuxostat 40 mg oral tablet  -- 1 tab(s) by mouth once a day  -- Indication: For Gouty arthritis    fluocinonide 0.05% topical cream  -- 1 application on skin every 12 hours  -- Indication: For Psoriasis    docusate sodium 100 mg oral capsule  -- 1 cap(s) by mouth 3 times a day  -- Indication: For Constipation due to opioid therapy    polyethylene glycol 3350 oral powder for reconstitution  -- 17 gram(s) by mouth once a day as needed for constipation   -- Indication: For Constipation due to opioid therapy    melatonin 3 mg oral tablet  -- 1 tab(s) by mouth once a day (at bedtime), As Needed -for insomnia   -- Indication: For insomnia    pantoprazole 40 mg oral delayed release tablet  -- 1 tab(s) by mouth once a day (before a meal)  -- Indication: For GERD    Multiple Vitamins oral tablet  -- 1 tab(s) by mouth once a day  -- Indication: For Supplement

## 2018-09-25 LAB
ANION GAP SERPL CALC-SCNC: 6 MMOL/L — SIGNIFICANT CHANGE UP (ref 5–17)
BUN SERPL-MCNC: 30 MG/DL — HIGH (ref 7–23)
CALCIUM SERPL-MCNC: 9.1 MG/DL — SIGNIFICANT CHANGE UP (ref 8.4–10.5)
CHLORIDE SERPL-SCNC: 99 MMOL/L — SIGNIFICANT CHANGE UP (ref 96–108)
CO2 SERPL-SCNC: 31 MMOL/L — SIGNIFICANT CHANGE UP (ref 22–31)
CREAT SERPL-MCNC: 1.61 MG/DL — HIGH (ref 0.5–1.3)
GLUCOSE SERPL-MCNC: 98 MG/DL — SIGNIFICANT CHANGE UP (ref 70–99)
POTASSIUM SERPL-MCNC: 4.9 MMOL/L — SIGNIFICANT CHANGE UP (ref 3.5–5.3)
POTASSIUM SERPL-SCNC: 4.9 MMOL/L — SIGNIFICANT CHANGE UP (ref 3.5–5.3)
SODIUM SERPL-SCNC: 136 MMOL/L — SIGNIFICANT CHANGE UP (ref 135–145)

## 2018-09-25 PROCEDURE — 99232 SBSQ HOSP IP/OBS MODERATE 35: CPT

## 2018-09-25 RX ORDER — GABAPENTIN 400 MG/1
2 CAPSULE ORAL
Qty: 0 | Refills: 0 | COMMUNITY
Start: 2018-09-25

## 2018-09-25 RX ORDER — FEBUXOSTAT 40 MG/1
1 TABLET ORAL
Qty: 0 | Refills: 0 | COMMUNITY
Start: 2018-09-25

## 2018-09-25 RX ORDER — OXYCODONE HYDROCHLORIDE 5 MG/1
1 TABLET ORAL
Qty: 0 | Refills: 0 | COMMUNITY
Start: 2018-09-25

## 2018-09-25 RX ORDER — TAMSULOSIN HYDROCHLORIDE 0.4 MG/1
1 CAPSULE ORAL
Qty: 0 | Refills: 0 | COMMUNITY
Start: 2018-09-25

## 2018-09-25 RX ORDER — SERTRALINE 25 MG/1
1 TABLET, FILM COATED ORAL
Qty: 0 | Refills: 0 | COMMUNITY
Start: 2018-09-25

## 2018-09-25 RX ORDER — LIDOCAINE 4 G/100G
15 CREAM TOPICAL
Qty: 0 | Refills: 0 | Status: DISCONTINUED | OUTPATIENT
Start: 2018-09-25 | End: 2018-09-27

## 2018-09-25 RX ADMIN — FEBUXOSTAT 40 MILLIGRAM(S): 40 TABLET ORAL at 17:58

## 2018-09-25 RX ADMIN — Medication 250 MILLIGRAM(S): at 06:56

## 2018-09-25 RX ADMIN — Medication 100 MILLIGRAM(S): at 21:27

## 2018-09-25 RX ADMIN — Medication 3 MILLIGRAM(S): at 21:27

## 2018-09-25 RX ADMIN — Medication 1 APPLICATION(S): at 06:56

## 2018-09-25 RX ADMIN — Medication 150 MILLIGRAM(S): at 06:55

## 2018-09-25 RX ADMIN — OXYCODONE HYDROCHLORIDE 10 MILLIGRAM(S): 5 TABLET ORAL at 09:23

## 2018-09-25 RX ADMIN — Medication 1 TABLET(S): at 13:20

## 2018-09-25 RX ADMIN — HEPARIN SODIUM 5000 UNIT(S): 5000 INJECTION INTRAVENOUS; SUBCUTANEOUS at 13:20

## 2018-09-25 RX ADMIN — HEPARIN SODIUM 5000 UNIT(S): 5000 INJECTION INTRAVENOUS; SUBCUTANEOUS at 06:55

## 2018-09-25 RX ADMIN — Medication 250 MILLIGRAM(S): at 17:58

## 2018-09-25 RX ADMIN — Medication 150 MILLIGRAM(S): at 13:51

## 2018-09-25 RX ADMIN — POLYETHYLENE GLYCOL 3350 17 GRAM(S): 17 POWDER, FOR SOLUTION ORAL at 17:58

## 2018-09-25 RX ADMIN — Medication 1 APPLICATION(S): at 17:59

## 2018-09-25 RX ADMIN — Medication 100 MILLIGRAM(S): at 06:55

## 2018-09-25 RX ADMIN — PANTOPRAZOLE SODIUM 40 MILLIGRAM(S): 20 TABLET, DELAYED RELEASE ORAL at 06:55

## 2018-09-25 RX ADMIN — GABAPENTIN 600 MILLIGRAM(S): 400 CAPSULE ORAL at 21:28

## 2018-09-25 RX ADMIN — SERTRALINE 25 MILLIGRAM(S): 25 TABLET, FILM COATED ORAL at 13:20

## 2018-09-25 RX ADMIN — OXYCODONE HYDROCHLORIDE 10 MILLIGRAM(S): 5 TABLET ORAL at 14:08

## 2018-09-25 RX ADMIN — OXYCODONE HYDROCHLORIDE 10 MILLIGRAM(S): 5 TABLET ORAL at 10:15

## 2018-09-25 RX ADMIN — Medication 100 MILLIGRAM(S): at 13:51

## 2018-09-25 RX ADMIN — LIDOCAINE 15 MILLILITER(S): 4 CREAM TOPICAL at 17:58

## 2018-09-25 RX ADMIN — HEPARIN SODIUM 5000 UNIT(S): 5000 INJECTION INTRAVENOUS; SUBCUTANEOUS at 21:28

## 2018-09-25 NOTE — PROGRESS NOTE ADULT - SUBJECTIVE AND OBJECTIVE BOX
This is a 62 y/o male with h/o HTN, gouty arthritis, lumbar disc disease, OA with L4-L5 spondylolisthesis with stenosis and radiculopathy s/p L4-5 laminectomy and decompression with transforaminal lumbar interbody fusion and placement of interbody cage and posterior spinal fusion L4-5 by Dr Orlando Neville on 9/10/18 at [facility].       TODAYs SUBJECTIVE  Patient seen and examined at bedside. seated in WC. Reports improvement in LLE pain, but has some discomfort with function. Voiding well and reports some constipation but moving bowels daily      [X] Constiutional WNL            [X] Cardio WNL               [X] Resp WNL  [ ] GI- feels somewhat constipated                     [X]  WNL                    [X] Heme WNL    Vital Signs Last 24 Hrs  T(C): 36.7 (25 Sep 2018 07:35), Max: 37 (24 Sep 2018 20:51)  T(F): 98.1 (25 Sep 2018 07:35), Max: 98.6 (24 Sep 2018 20:51)  HR: 72 (25 Sep 2018 07:35) (72 - 78)  BP: 101/66 (25 Sep 2018 07:35) (97/62 - 110/69)  BP(mean): --  RR: 14 (25 Sep 2018 07:35) (14 - 14)  SpO2: 94% (25 Sep 2018 07:35) (94% - 95%)        General: NAD, sitting in chair   Oral cavity: right lower gum molar area - swelling improved                                 Cardio: S1-S2,                           Pulm: Lungs CTAB                        Abdomen: no tenderness to palpation,                                           Ext: no edema in LE   Wounds: Surgical incision on the lower back x 2 , has some irritation at edges of tegaderm- all dressing removed and - small area of gauze attached to incision removed and abd pad and paper tape applied- ( small area of fluid filled blister at inferior edge-   Skin: LE with psoriatic lesions stable      Function:  gait: CG/min assist with walker  Transfers: cg/min assist  Functional transfers: Contact guard     Labs:      09-25    136  |  99  |  30<H>  ----------------------------<  98  4.9   |  31  |  1.61<H>    Ca    9.1      25 Sep 2018 05:07                          13.8   7.1   )-----------( 224      ( 24 Sep 2018 05:40 )             42.0     09-24    135  |  99  |  30<H>  ----------------------------<  101<H>  5.0   |  28  |  1.61<H>    Ca    9.0      24 Sep 2018 05:40        MEDICATIONS  (STANDING):  amLODIPine   Tablet 5 milliGRAM(s) Oral daily  BACItracin   Ointment 1 Application(s) Topical two times a day  clindamycin   Capsule 150 milliGRAM(s) Oral every 6 hours  docusate sodium 100 milliGRAM(s) Oral three times a day  febuxostat 40 milliGRAM(s) Oral daily  fluocinonide 0.05% Cream 1 Application(s) Topical every 12 hours  gabapentin 600 milliGRAM(s) Oral at bedtime  heparin  Injectable 5000 Unit(s) SubCutaneous every 8 hours  melatonin 3 milliGRAM(s) Oral at bedtime  multivitamin 1 Tablet(s) Oral daily  pantoprazole    Tablet 40 milliGRAM(s) Oral before breakfast  polyethylene glycol 3350 17 Gram(s) Oral daily  saccharomyces boulardii 250 milliGRAM(s) Oral two times a day  sertraline 25 milliGRAM(s) Oral daily  tamsulosin 0.4 milliGRAM(s) Oral at bedtime    MEDICATIONS  (PRN):  acetaminophen   Tablet .. 975 milliGRAM(s) Oral every 6 hours PRN Temp greater or equal to 38C (100.4F), Mild Pain (1 - 3), Moderate Pain (4 - 6)  FIRST- Mouthwash  BLM 15 milliLiter(s) Swish and Spit two times a day PRN Mouth Care  magnesium hydroxide Suspension 30 milliLiter(s) Oral daily PRN Constipation  oxyCODONE    IR 10 milliGRAM(s) Oral every 4 hours PRN Severe Pain (7 - 10)            Assessment/Plan    This is a 62 y/o male with h/o HTN, gouty arthritis, lumbar disc disease, OA with L4-L5 spondylolisthesis with stenosis and radiculopathy s/p L4-5 laminectomy and decompression with transforaminal lumbar interbody fusion and placement of interbody cage and posterior spinal fusion L4-5 with functional deficits including gait and ADL impairments with decreased endurance and strength.     COMORBIDITES/ACTIVE MEDICAL ISSUES     Gait Instability, ADL impairments and Functional impairments 2/2 recent [facility]: Comprehensive Rehab Program of PT/OT     Neuro/MSK:  -Pain control: Tylenol , Oxycodone PRN, gabapentin QHS- fatigue much improved with d/c of day time neurontin    Cardio:-continue norvasc. (Losartan - dose reduced 9/17 to 50mg and then dc on 9/18. )BP still low normal    GI/Bowel Mgmt - On Protonix for GI prophylaxis,  Off all bowel regimen as patient had large watery stool - continue colace and miralax    Renal/Lytes: TOV 9/20, voiding after d/c of multani. continue flomax.     DVT: SCD, TEDS,  heparin sq     Skin:-monitor surgical incision for drainage, Steroid cream for psoriatic  lesions. D/W patient not to scratch back incision.     Dental abscess: On abx( clindamycin) till 9/25 with florostar.    Diet: regular with thin. MVI    Ill fitting brace: adjusted by PT    Mild GODFREY: encourage PO fluids. Labs stable    Precautions / PROPHYLAXIS:   - Falls, Spinal  - ortho: Weight bearing status: WBAT   - Lungs: Aspiration, Incentive Spirometer  - Pressure injury/Skin: Turn Q2hrs while in bed, OOB to Chair, PT/OT      d/c home on 9/27 This is a 62 y/o male with h/o HTN, gouty arthritis, lumbar disc disease, OA with L4-L5 spondylolisthesis with stenosis and radiculopathy s/p L4-5 laminectomy and decompression with transforaminal lumbar interbody fusion and placement of interbody cage and posterior spinal fusion L4-5 by Dr Orlando Neville on 9/10/18 at [facility].       TODAYs SUBJECTIVE  Patient seen and examined at bedside. seated in WC. Reports improvement in LLE pain, but has some discomfort with function. Voiding well and reports some constipation but moving bowels daily      [X] Constiutional WNL            [X] Cardio WNL               [X] Resp WNL  [ ] GI- feels somewhat constipated                     [X]  WNL                    [X] Heme WNL    Vital Signs Last 24 Hrs  T(C): 36.7 (25 Sep 2018 07:35), Max: 37 (24 Sep 2018 20:51)  T(F): 98.1 (25 Sep 2018 07:35), Max: 98.6 (24 Sep 2018 20:51)  HR: 72 (25 Sep 2018 07:35) (72 - 78)  BP: 101/66 (25 Sep 2018 07:35) (97/62 - 110/69)  BP(mean): --  RR: 14 (25 Sep 2018 07:35) (14 - 14)  SpO2: 94% (25 Sep 2018 07:35) (94% - 95%)        General: NAD, sitting in chair   Oral cavity: right lower gum molar area - swelling improved                                 Cardio: S1-S2,                           Pulm: Lungs CTAB                        Abdomen: no tenderness to palpation,                                           Ext: no edema in LE   Wounds: Surgical incision on the lower back x 2 , has some irritation at edges of tegaderm- all dressing removed and - small area of gauze attached to incision removed and abd pad and paper tape applied- ( small area of fluid filled blister at inferior edge-   Skin: LE with psoriatic lesions stable      Function:  gait: CG/min assist with walker  Transfers: cg/min assist  Functional transfers: Contact guard     Labs:      09-25    136  |  99  |  30<H>  ----------------------------<  98  4.9   |  31  |  1.61<H>    Ca    9.1      25 Sep 2018 05:07                          13.8   7.1   )-----------( 224      ( 24 Sep 2018 05:40 )             42.0     09-24    135  |  99  |  30<H>  ----------------------------<  101<H>  5.0   |  28  |  1.61<H>    Ca    9.0      24 Sep 2018 05:40        MEDICATIONS  (STANDING):  amLODIPine   Tablet 5 milliGRAM(s) Oral daily  BACItracin   Ointment 1 Application(s) Topical two times a day  clindamycin   Capsule 150 milliGRAM(s) Oral every 6 hours  docusate sodium 100 milliGRAM(s) Oral three times a day  febuxostat 40 milliGRAM(s) Oral daily  fluocinonide 0.05% Cream 1 Application(s) Topical every 12 hours  gabapentin 600 milliGRAM(s) Oral at bedtime  heparin  Injectable 5000 Unit(s) SubCutaneous every 8 hours  melatonin 3 milliGRAM(s) Oral at bedtime  multivitamin 1 Tablet(s) Oral daily  pantoprazole    Tablet 40 milliGRAM(s) Oral before breakfast  polyethylene glycol 3350 17 Gram(s) Oral daily  saccharomyces boulardii 250 milliGRAM(s) Oral two times a day  sertraline 25 milliGRAM(s) Oral daily  tamsulosin 0.4 milliGRAM(s) Oral at bedtime    MEDICATIONS  (PRN):  acetaminophen   Tablet .. 975 milliGRAM(s) Oral every 6 hours PRN Temp greater or equal to 38C (100.4F), Mild Pain (1 - 3), Moderate Pain (4 - 6)  FIRST- Mouthwash  BLM 15 milliLiter(s) Swish and Spit two times a day PRN Mouth Care  magnesium hydroxide Suspension 30 milliLiter(s) Oral daily PRN Constipation  oxyCODONE    IR 10 milliGRAM(s) Oral every 4 hours PRN Severe Pain (7 - 10)            Assessment/Plan    This is a 62 y/o male with h/o HTN, gouty arthritis, lumbar disc disease, OA with L4-L5 spondylolisthesis with stenosis and radiculopathy s/p L4-5 laminectomy and decompression with transforaminal lumbar interbody fusion and placement of interbody cage and posterior spinal fusion L4-5 with functional deficits including gait and ADL impairments with decreased endurance and strength.     COMORBIDITES/ACTIVE MEDICAL ISSUES     Gait Instability, ADL impairments and Functional impairments 2/2 recent [facility]: Comprehensive Rehab Program of PT/OT     Neuro/MSK:  -Pain control: Tylenol , Oxycodone PRN, gabapentin QHS- fatigue much improved with d/c of day time neurontin    Cardio:-continue norvasc. (Losartan - dose reduced 9/17 to 50mg and then dc on 9/18. )BP still low normal    GI/Bowel Mgmt - On Protonix for GI prophylaxis,  Off all bowel regimen as patient had large watery stool - continue colace and miralax    Renal/Lytes: TOV 9/20, voiding after d/c of multani. continue flomax. d/c flomax and monitor    DVT: SCD, TEDS,  heparin sq     Skin:-monitor surgical incision for drainage, Steroid cream for psoriatic  lesions. D/W patient not to scratch back incision.     Dental abscess: On abx( clindamycin) till 9/25 with florostar.    Diet: regular with thin. MVI    Ill fitting brace: adjusted by PT    Mild GODFREY: encourage PO fluids. Labs stable    Precautions / PROPHYLAXIS:   - Falls, Spinal  - ortho: Weight bearing status: WBAT   - Lungs: Aspiration, Incentive Spirometer  - Pressure injury/Skin: Turn Q2hrs while in bed, OOB to Chair, PT/OT      d/c home on 9/27

## 2018-09-26 PROCEDURE — 99232 SBSQ HOSP IP/OBS MODERATE 35: CPT

## 2018-09-26 RX ORDER — LANOLIN ALCOHOL/MO/W.PET/CERES
1 CREAM (GRAM) TOPICAL
Qty: 30 | Refills: 0 | OUTPATIENT
Start: 2018-09-26 | End: 2018-10-25

## 2018-09-26 RX ORDER — PANTOPRAZOLE SODIUM 20 MG/1
1 TABLET, DELAYED RELEASE ORAL
Qty: 30 | Refills: 0 | OUTPATIENT
Start: 2018-09-26 | End: 2018-10-25

## 2018-09-26 RX ORDER — GABAPENTIN 400 MG/1
2 CAPSULE ORAL
Qty: 60 | Refills: 0 | OUTPATIENT
Start: 2018-09-26 | End: 2018-10-25

## 2018-09-26 RX ORDER — SERTRALINE 25 MG/1
1 TABLET, FILM COATED ORAL
Qty: 30 | Refills: 0 | OUTPATIENT
Start: 2018-09-26 | End: 2018-10-25

## 2018-09-26 RX ORDER — POLYETHYLENE GLYCOL 3350 17 G/17G
17 POWDER, FOR SOLUTION ORAL
Qty: 1 | Refills: 0 | OUTPATIENT
Start: 2018-09-26 | End: 2018-10-25

## 2018-09-26 RX ORDER — FEBUXOSTAT 40 MG/1
1 TABLET ORAL
Qty: 30 | Refills: 0 | OUTPATIENT
Start: 2018-09-26 | End: 2018-10-25

## 2018-09-26 RX ORDER — ACETAMINOPHEN 500 MG
1 TABLET ORAL
Qty: 20 | Refills: 0 | OUTPATIENT
Start: 2018-09-26 | End: 2018-09-30

## 2018-09-26 RX ORDER — OXYCODONE HYDROCHLORIDE 5 MG/1
1 TABLET ORAL
Qty: 28 | Refills: 0 | OUTPATIENT
Start: 2018-09-26 | End: 2018-10-02

## 2018-09-26 RX ADMIN — OXYCODONE HYDROCHLORIDE 10 MILLIGRAM(S): 5 TABLET ORAL at 15:00

## 2018-09-26 RX ADMIN — Medication 1 APPLICATION(S): at 18:42

## 2018-09-26 RX ADMIN — DIPHENHYDRAMINE HYDROCHLORIDE AND LIDOCAINE HYDROCHLORIDE AND ALUMINUM HYDROXIDE AND MAGNESIUM HYDRO 15 MILLILITER(S): KIT at 18:42

## 2018-09-26 RX ADMIN — PANTOPRAZOLE SODIUM 40 MILLIGRAM(S): 20 TABLET, DELAYED RELEASE ORAL at 06:00

## 2018-09-26 RX ADMIN — Medication 1 APPLICATION(S): at 06:01

## 2018-09-26 RX ADMIN — OXYCODONE HYDROCHLORIDE 10 MILLIGRAM(S): 5 TABLET ORAL at 08:55

## 2018-09-26 RX ADMIN — Medication 3 MILLIGRAM(S): at 21:53

## 2018-09-26 RX ADMIN — SERTRALINE 25 MILLIGRAM(S): 25 TABLET, FILM COATED ORAL at 13:13

## 2018-09-26 RX ADMIN — Medication 1 TABLET(S): at 13:13

## 2018-09-26 RX ADMIN — Medication 100 MILLIGRAM(S): at 06:00

## 2018-09-26 RX ADMIN — GABAPENTIN 600 MILLIGRAM(S): 400 CAPSULE ORAL at 21:53

## 2018-09-26 RX ADMIN — OXYCODONE HYDROCHLORIDE 10 MILLIGRAM(S): 5 TABLET ORAL at 00:23

## 2018-09-26 RX ADMIN — OXYCODONE HYDROCHLORIDE 10 MILLIGRAM(S): 5 TABLET ORAL at 09:00

## 2018-09-26 RX ADMIN — OXYCODONE HYDROCHLORIDE 10 MILLIGRAM(S): 5 TABLET ORAL at 00:53

## 2018-09-26 RX ADMIN — Medication 100 MILLIGRAM(S): at 13:14

## 2018-09-26 RX ADMIN — Medication 250 MILLIGRAM(S): at 06:00

## 2018-09-26 RX ADMIN — HEPARIN SODIUM 5000 UNIT(S): 5000 INJECTION INTRAVENOUS; SUBCUTANEOUS at 21:53

## 2018-09-26 RX ADMIN — HEPARIN SODIUM 5000 UNIT(S): 5000 INJECTION INTRAVENOUS; SUBCUTANEOUS at 13:13

## 2018-09-26 RX ADMIN — Medication 975 MILLIGRAM(S): at 10:45

## 2018-09-26 RX ADMIN — Medication 975 MILLIGRAM(S): at 10:00

## 2018-09-26 RX ADMIN — OXYCODONE HYDROCHLORIDE 10 MILLIGRAM(S): 5 TABLET ORAL at 14:44

## 2018-09-26 RX ADMIN — Medication 100 MILLIGRAM(S): at 21:53

## 2018-09-26 RX ADMIN — HEPARIN SODIUM 5000 UNIT(S): 5000 INJECTION INTRAVENOUS; SUBCUTANEOUS at 06:00

## 2018-09-26 RX ADMIN — POLYETHYLENE GLYCOL 3350 17 GRAM(S): 17 POWDER, FOR SOLUTION ORAL at 13:13

## 2018-09-26 RX ADMIN — FEBUXOSTAT 40 MILLIGRAM(S): 40 TABLET ORAL at 13:13

## 2018-09-26 RX ADMIN — Medication 250 MILLIGRAM(S): at 18:41

## 2018-09-26 NOTE — PROGRESS NOTE ADULT - SUBJECTIVE AND OBJECTIVE BOX
This is a 64 y/o male with h/o HTN, gouty arthritis, lumbar disc disease, OA with L4-L5 spondylolisthesis with stenosis and radiculopathy s/p L4-5 laminectomy and decompression with transforaminal lumbar interbody fusion and placement of interbody cage and posterior spinal fusion L4-5 by Dr Orlando Neville on 9/10/18 at [facility].       TODAYs SUBJECTIVE  Patient seen and examined at bedside. seated at edge of bed. Had some left posterior calf/knee discomfort after some exercise last evening. Much improved this am. Denies any bladder or bowel issues. 2. BP low normal and patient did not get amlodipine for 4 days      [X] Constiutional WNL            [X] Cardio WNL               [X] Resp WNL  [ ] GI- improved constipation                  [X]  WNL                    [X] Heme WNL    Vital Signs Last 24 Hrs  T(C): 36.8 (26 Sep 2018 08:03), Max: 36.8 (26 Sep 2018 08:03)  T(F): 98.2 (26 Sep 2018 08:03), Max: 98.2 (26 Sep 2018 08:03)  HR: 77 (26 Sep 2018 08:03) (71 - 77)  BP: 106/66 (26 Sep 2018 08:03) (105/68 - 111/75)  BP(mean): --  RR: 14 (26 Sep 2018 08:03) (14 - 14)  SpO2: 98% (26 Sep 2018 08:03) (94% - 98%)    General: NAD   Oral cavity: right lower gum molar area - swelling resolved                             Cardio: S1-S2,                           Pulm: Lungs CTAB                        Abdomen: no tenderness to palpation,                                           Ext: no edema in LE   Wounds: Surgical incision on the lower back x 2 , skin irritation  at edges of tegaderm- resolved small area of fluid filled blister at inferior edge-dry   Skin: LE with psoriatic lesions stable      Function:  gait: CG/min assist with walker  Transfers: cg/min assist  Functional transfers: Contact guard     Labs:      09-25    136  |  99  |  30<H>  ----------------------------<  98  4.9   |  31  |  1.61<H>    Ca    9.1      25 Sep 2018 05:07                          13.8   7.1   )-----------( 224      ( 24 Sep 2018 05:40 )             42.0     09-24    135  |  99  |  30<H>  ----------------------------<  101<H>  5.0   |  28  |  1.61<H>    Ca    9.0      24 Sep 2018 05:40        MEDICATIONS  (STANDING):  amLODIPine   Tablet 5 milliGRAM(s) Oral daily  docusate sodium 100 milliGRAM(s) Oral three times a day  febuxostat 40 milliGRAM(s) Oral daily  fluocinonide 0.05% Cream 1 Application(s) Topical every 12 hours  gabapentin 600 milliGRAM(s) Oral at bedtime  heparin  Injectable 5000 Unit(s) SubCutaneous every 8 hours  lidocaine 2% Viscous 15 milliLiter(s) Swish and Spit two times a day  melatonin 3 milliGRAM(s) Oral at bedtime  multivitamin 1 Tablet(s) Oral daily  pantoprazole    Tablet 40 milliGRAM(s) Oral before breakfast  polyethylene glycol 3350 17 Gram(s) Oral daily  saccharomyces boulardii 250 milliGRAM(s) Oral two times a day  sertraline 25 milliGRAM(s) Oral daily    MEDICATIONS  (PRN):  acetaminophen   Tablet .. 975 milliGRAM(s) Oral every 6 hours PRN Temp greater or equal to 38C (100.4F), Mild Pain (1 - 3), Moderate Pain (4 - 6)  FIRST- Mouthwash  BLM 15 milliLiter(s) Swish and Spit two times a day PRN Mouth Care  magnesium hydroxide Suspension 30 milliLiter(s) Oral daily PRN Constipation  oxyCODONE    IR 10 milliGRAM(s) Oral every 4 hours PRN Severe Pain (7 - 10)            Assessment/Plan    This is a 64 y/o male with h/o HTN, gouty arthritis, lumbar disc disease, OA with L4-L5 spondylolisthesis with stenosis and radiculopathy s/p L4-5 laminectomy and decompression with transforaminal lumbar interbody fusion and placement of interbody cage and posterior spinal fusion L4-5 with functional deficits including gait and ADL impairments with decreased endurance and strength.     COMORBIDITES/ACTIVE MEDICAL ISSUES     Gait Instability, ADL impairments and Functional impairments 2/2 recent [facility]: Comprehensive Rehab Program of PT/OT     Neuro/MSK:  -Pain control: Tylenol , Oxycodone PRN, gabapentin QHS    Cardio:continue norvasc. (Losartan - dose reduced 9/17 to 50mg and then dc on 9/18. )BP low normal    GI/Bowel Mgmt - On Protonix for GI prophylaxis,  continue colace and miralax    Renal/Lytes: TOV 9/20, voiding after d/c of multani. flomax dc 9/25. will monitor    DVT: SCD, TEDS,  heparin sq     Skin:- back incision with dry dressing, Steroid cream for psoriatic  lesions.    Dental abscess: completed clindamycin on  9/25 continue florostar. d/c at discharge    Diet: regular with thin. MVI    Ill fitting brace: adjusted by PT, will need some assistance at home to don brace    Mild GODFREY: encourage PO fluids. Labs stable    Precautions / PROPHYLAXIS:   - Falls, Spinal  - ortho: Weight bearing status: WBAT   - Lungs: Aspiration, Incentive Spirometer  - Pressure injury/Skin: Turn Q2hrs while in bed, OOB to Chair, PT/OT      d/c home on 9/27 with home care. Discussed fu with PCP within one week, - as needs monitoring and decision about BP meds as unlikely to be discharged home on meds as being held

## 2018-09-26 NOTE — PROGRESS NOTE ADULT - SUBJECTIVE AND OBJECTIVE BOX
Patient is a 63y old  Male who presents with a chief complaint of s/p transforaminal, L4-5, lumbar interbody fusion (26 Sep 2018 08:42)      Patient seen and examined at bedside, no events overnight, in no acute distress.     ALLERGIES:  No Known Allergies    MEDICATIONS:  acetaminophen   Tablet .. 975 milliGRAM(s) Oral every 6 hours PRN  amLODIPine   Tablet 5 milliGRAM(s) Oral daily  docusate sodium 100 milliGRAM(s) Oral three times a day  febuxostat 40 milliGRAM(s) Oral daily  FIRST- Mouthwash  BLM 15 milliLiter(s) Swish and Spit two times a day PRN  fluocinonide 0.05% Cream 1 Application(s) Topical every 12 hours  gabapentin 600 milliGRAM(s) Oral at bedtime  heparin  Injectable 5000 Unit(s) SubCutaneous every 8 hours  lidocaine 2% Viscous 15 milliLiter(s) Swish and Spit two times a day  magnesium hydroxide Suspension 30 milliLiter(s) Oral daily PRN  melatonin 3 milliGRAM(s) Oral at bedtime  multivitamin 1 Tablet(s) Oral daily  oxyCODONE    IR 10 milliGRAM(s) Oral every 4 hours PRN  pantoprazole    Tablet 40 milliGRAM(s) Oral before breakfast  polyethylene glycol 3350 17 Gram(s) Oral daily  saccharomyces boulardii 250 milliGRAM(s) Oral two times a day  sertraline 25 milliGRAM(s) Oral daily    Vital Signs Last 24 Hrs  T(C): 36.8 (26 Sep 2018 08:03), Max: 36.8 (26 Sep 2018 08:03)  T(F): 98.2 (26 Sep 2018 08:03), Max: 98.2 (26 Sep 2018 08:03)  HR: 77 (26 Sep 2018 08:03) (71 - 77)  BP: 106/66 (26 Sep 2018 08:03) (105/68 - 111/75)  BP(mean): --  RR: 14 (26 Sep 2018 08:03) (14 - 14)  SpO2: 98% (26 Sep 2018 08:03) (94% - 98%)  I&O's Summary    25 Sep 2018 07:01  -  26 Sep 2018 07:00  --------------------------------------------------------  IN: 0 mL / OUT: 800 mL / NET: -800 mL          PAST MEDICAL & SURGICAL HISTORY:  Spinal stenosis  Radiculopathy  Spondylolisthesis  Varicosities of leg  Cyst of left kidney  Renal insufficiency  Psoriasis  Osteoarthritis  Nephrolithiasis  Lumbar disc disease  Gouty arthritis  Essential hypertension  Allergic rhinitis  S/P lumbar fusion  History of dental surgery      Home Medications:  acetaminophen 325 mg oral tablet: 3 tab(s) orally every 6 hours, As needed, Temp greater or equal to 38C (100.4F), Mild Pain (1 - 3), Moderate Pain (4 - 6) (24 Sep 2018 15:33)  docusate sodium 100 mg oral capsule: 1 cap(s) orally 3 times a day (24 Sep 2018 15:33)  febuxostat 40 mg oral tablet: 1 tab(s) orally once a day (25 Sep 2018 11:40)  fluocinonide 0.05% topical cream: 1 application topically every 12 hours (24 Sep 2018 15:33)  gabapentin 300 mg oral capsule: 2 cap(s) orally once a day (at bedtime) (25 Sep 2018 11:40)  melatonin 3 mg oral tablet: 1 tab(s) orally once a day (at bedtime) (24 Sep 2018 15:33)  Multiple Vitamins oral tablet: 1 tab(s) orally once a day (24 Sep 2018 15:33)  oxyCODONE 10 mg oral tablet: 1 tab(s) orally every 4 hours, As needed, Severe Pain (7 - 10) (25 Sep 2018 11:40)  pantoprazole 40 mg oral delayed release tablet: 1 tab(s) orally once a day (before a meal) (24 Sep 2018 15:33)  polyethylene glycol 3350 oral powder for reconstitution: 17 gram(s) orally once a day (24 Sep 2018 15:33)  sertraline 25 mg oral tablet: 1 tab(s) orally once a day (25 Sep 2018 11:40)  tamsulosin 0.4 mg oral capsule: 1 cap(s) orally once a day (at bedtime) (25 Sep 2018 11:40)      PHYSICAL EXAM:  General: NAD, A/O x3  ENT: MMM  Neck: Supple, No JVD  Lungs: Clear to percussion bilaterally  Cardio: RRR, S1/S2, No murmurs  Abdomen: Soft, Nontender, Nondistended; Bowel sounds present  Extremities: No clubbing, cyanosis, or edema    LABS:                        13.8   7.1   )-----------( 224      ( 24 Sep 2018 05:40 )             42.0     09-25    136  |  99  |  30  ----------------------------<  98  4.9   |  31  |  1.61    Ca    9.1      25 Sep 2018 05:07        RADIOLOGY & ADDITIONAL TESTS: < from: Xray Abdomen 2 Views (09.16.18 @ 10:28) >    There are some basilar atelectatic changes.    < end of copied text >      Care Discussed with Consultants/Other Providers:  PM&R team

## 2018-09-26 NOTE — PROGRESS NOTE ADULT - ASSESSMENT
64 y/o male with PMH of HTN, gouty arthritis, lumbar disc disease, OA with L4-L5 spondylolisthesis with stenosis and radiculopathy s/p L4-5 laminectomy and decompression with transforaminal lumbar interbody fusion and placement of interbody cage and posterior spinal fusion L4-5, admitted for rehab.       - S/P L4-5 laminectomy and decompression with transforaminal lumbar interbody fusion  pain management  PT/OT as tolerated     - HTN  Controlled on current regimen    - Dental abscess. Seen by Dr Dunlap, MAR[facility]ALL. continue clindamycin  on probiotic    - Urinary retention   No Siegel, cont. Flomax    - GODFREY 2/2 suspect urinary retention  Avoid nephrotoxic drugs, monitor creatinine     - Constipation  Cont bowel protocol: colace, senna, ducolax, lactulose, miralax, MOM          5.Urinary Retention  passed TOV, continue flomax  urology eval noted

## 2018-09-27 VITALS
OXYGEN SATURATION: 93 % | HEART RATE: 74 BPM | TEMPERATURE: 98 F | DIASTOLIC BLOOD PRESSURE: 74 MMHG | SYSTOLIC BLOOD PRESSURE: 119 MMHG | RESPIRATION RATE: 14 BRPM

## 2018-09-27 PROCEDURE — 97167 OT EVAL HIGH COMPLEX 60 MIN: CPT

## 2018-09-27 PROCEDURE — 85027 COMPLETE CBC AUTOMATED: CPT

## 2018-09-27 PROCEDURE — 97530 THERAPEUTIC ACTIVITIES: CPT

## 2018-09-27 PROCEDURE — 97110 THERAPEUTIC EXERCISES: CPT

## 2018-09-27 PROCEDURE — 83735 ASSAY OF MAGNESIUM: CPT

## 2018-09-27 PROCEDURE — 99238 HOSP IP/OBS DSCHRG MGMT 30/<: CPT

## 2018-09-27 PROCEDURE — 80048 BASIC METABOLIC PNL TOTAL CA: CPT

## 2018-09-27 PROCEDURE — 74019 RADEX ABDOMEN 2 VIEWS: CPT

## 2018-09-27 PROCEDURE — 97116 GAIT TRAINING THERAPY: CPT

## 2018-09-27 PROCEDURE — 97535 SELF CARE MNGMENT TRAINING: CPT

## 2018-09-27 PROCEDURE — 84100 ASSAY OF PHOSPHORUS: CPT

## 2018-09-27 PROCEDURE — 80053 COMPREHEN METABOLIC PANEL: CPT

## 2018-09-27 RX ADMIN — Medication 1 APPLICATION(S): at 17:26

## 2018-09-27 RX ADMIN — LIDOCAINE 15 MILLILITER(S): 4 CREAM TOPICAL at 17:26

## 2018-09-27 RX ADMIN — SERTRALINE 25 MILLIGRAM(S): 25 TABLET, FILM COATED ORAL at 11:41

## 2018-09-27 RX ADMIN — PANTOPRAZOLE SODIUM 40 MILLIGRAM(S): 20 TABLET, DELAYED RELEASE ORAL at 05:48

## 2018-09-27 RX ADMIN — FEBUXOSTAT 40 MILLIGRAM(S): 40 TABLET ORAL at 11:41

## 2018-09-27 RX ADMIN — Medication 250 MILLIGRAM(S): at 17:27

## 2018-09-27 RX ADMIN — Medication 1 APPLICATION(S): at 05:48

## 2018-09-27 RX ADMIN — Medication 100 MILLIGRAM(S): at 05:48

## 2018-09-27 RX ADMIN — Medication 100 MILLIGRAM(S): at 15:26

## 2018-09-27 RX ADMIN — HEPARIN SODIUM 5000 UNIT(S): 5000 INJECTION INTRAVENOUS; SUBCUTANEOUS at 15:26

## 2018-09-27 RX ADMIN — OXYCODONE HYDROCHLORIDE 10 MILLIGRAM(S): 5 TABLET ORAL at 15:59

## 2018-09-27 RX ADMIN — Medication 250 MILLIGRAM(S): at 05:48

## 2018-09-27 RX ADMIN — POLYETHYLENE GLYCOL 3350 17 GRAM(S): 17 POWDER, FOR SOLUTION ORAL at 11:41

## 2018-09-27 RX ADMIN — OXYCODONE HYDROCHLORIDE 10 MILLIGRAM(S): 5 TABLET ORAL at 09:00

## 2018-09-27 RX ADMIN — Medication 1 TABLET(S): at 11:41

## 2018-09-27 RX ADMIN — HEPARIN SODIUM 5000 UNIT(S): 5000 INJECTION INTRAVENOUS; SUBCUTANEOUS at 05:48

## 2018-09-27 RX ADMIN — OXYCODONE HYDROCHLORIDE 10 MILLIGRAM(S): 5 TABLET ORAL at 08:24

## 2018-09-27 NOTE — PROGRESS NOTE ADULT - ATTENDING COMMENTS
Chart reviewed. Patient reports some neck stiffness this am, affecting range. Otherwise no other new issues since yesterday.    Has made good progress in therapy and is scheduled for discharge home today with home care.  discussed with patient that he is not on any BP meds at discharge and will need to follow up with PCP regarding resumption of BP meds.    fu with surgeon and PCP in one week discussed with patient.

## 2018-09-27 NOTE — PROGRESS NOTE ADULT - REASON FOR ADMISSION
s/p transforaminal, L4-5, lumbar interbody fusion

## 2018-09-27 NOTE — PROGRESS NOTE ADULT - SUBJECTIVE AND OBJECTIVE BOX
This is a 62 y/o male with h/o HTN, gouty arthritis, lumbar disc disease, OA with L4-L5 spondylolisthesis with stenosis and radiculopathy s/p L4-5 laminectomy and decompression with transforaminal lumbar interbody fusion and placement of interbody cage and posterior spinal fusion L4-5 by Dr Orlando Neville on 9/10/18 at [facility].       TODAYs SUBJECTIVE  Patient seen and examined at bedside. States that he feels overall better and that he is ready to go home. He states that he has some pain at the left posterior knee.       [X] Constiutional WNL            [X] Cardio WNL               [X] Resp WNL  [ ] GI- improved constipation                  [X]  WNL                    [X] Heme WNL    Vital Signs Last 24 Hrs  T(C): 36.7 (27 Sep 2018 09:06), Max: 36.8 (26 Sep 2018 21:48)  T(F): 98 (27 Sep 2018 09:06), Max: 98.2 (26 Sep 2018 21:48)  HR: 74 (27 Sep 2018 09:06) (70 - 74)  BP: 119/74 (27 Sep 2018 09:06) (94/56 - 119/74)  BP(mean): --  RR: 14 (27 Sep 2018 09:06) (14 - 14)  SpO2: 93% (27 Sep 2018 09:06) (93% - 98%)    General: NAD   Oral cavity: right lower gum molar area - swelling resolved                             Cardio: S1-S2,                           Pulm: Lungs CTAB                        Abdomen: no tenderness to palpation,                                           Ext: no edema in LE   MSL: Tight left hamstring  Wounds: Surgical incision on the lower back x 2 , skin irritation  at edges of tegaderm- resolved small area of fluid filled blister at inferior edge-dry   Skin: LE with psoriatic lesions stable      Function:  gait: CG/min assist with walker  Transfers: cg/min assist  Functional transfers: Contact guard     Labs:      MEDICATIONS  (STANDING):  amLODIPine   Tablet 5 milliGRAM(s) Oral daily  docusate sodium 100 milliGRAM(s) Oral three times a day  febuxostat 40 milliGRAM(s) Oral daily  fluocinonide 0.05% Cream 1 Application(s) Topical every 12 hours  gabapentin 600 milliGRAM(s) Oral at bedtime  heparin  Injectable 5000 Unit(s) SubCutaneous every 8 hours  lidocaine 2% Viscous 15 milliLiter(s) Swish and Spit two times a day  melatonin 3 milliGRAM(s) Oral at bedtime  multivitamin 1 Tablet(s) Oral daily  pantoprazole    Tablet 40 milliGRAM(s) Oral before breakfast  polyethylene glycol 3350 17 Gram(s) Oral daily  saccharomyces boulardii 250 milliGRAM(s) Oral two times a day  sertraline 25 milliGRAM(s) Oral daily    MEDICATIONS  (PRN):  acetaminophen   Tablet .. 975 milliGRAM(s) Oral every 6 hours PRN Temp greater or equal to 38C (100.4F), Mild Pain (1 - 3), Moderate Pain (4 - 6)  FIRST- Mouthwash  BLM 15 milliLiter(s) Swish and Spit two times a day PRN Mouth Care  magnesium hydroxide Suspension 30 milliLiter(s) Oral daily PRN Constipation  oxyCODONE    IR 10 milliGRAM(s) Oral every 4 hours PRN Severe Pain (7 - 10)      Assessment/Plan    This is a 62 y/o male with h/o HTN, gouty arthritis, lumbar disc disease, OA with L4-L5 spondylolisthesis with stenosis and radiculopathy s/p L4-5 laminectomy and decompression with transforaminal lumbar interbody fusion and placement of interbody cage and posterior spinal fusion L4-5 with functional deficits including gait and ADL impairments with decreased endurance and strength.     COMORBIDITES/ACTIVE MEDICAL ISSUES     Gait Instability, ADL impairments and Functional impairments 2/2 recent [facility]: Comprehensive Rehab Program of PT/OT     Neuro/MSK:  -Pain control: Tylenol , Oxycodone PRN, gabapentin QHS    Cardio:will hold norvasc as his bp is normal (Losartan - dose reduced 9/17 to 50mg and then dc on 9/18. )BP low normal    GI/Bowel Mgmt - On Protonix for GI prophylaxis,  continue colace and miralax    Renal/Lytes: TOV 9/20, voiding after d/c of multani. flomax dc 9/25. will monitor    DVT: SCD, TEDS,  heparin sq     Skin:- back incision with dry dressing, Steroid cream for psoriatic  lesions.    Dental abscess: completed clindamycin on  9/25 continue florostar. d/c at discharge    Diet: regular with thin. MVI    Ill fitting brace: adjusted by PT, will need some assistance at home to don brace    Mild GODFREY: encourage PO fluids. Labs stable    Precautions / PROPHYLAXIS:   - Falls, Spinal  - ortho: Weight bearing status: WBAT   - Lungs: Aspiration, Incentive Spirometer  - Pressure injury/Skin: Turn Q2hrs while in bed, OOB to Chair, PT/OT      d/c home on 9/27 with home care. Discussed fu with PCP within one week, - as needs monitoring and decision about BP meds as unlikely to be discharged home on meds as being held

## 2018-10-03 ENCOUNTER — APPOINTMENT (OUTPATIENT)
Dept: ULTRASOUND IMAGING | Facility: IMAGING CENTER | Age: 63
End: 2018-10-03
Payer: OTHER MISCELLANEOUS

## 2018-10-03 ENCOUNTER — EMERGENCY (EMERGENCY)
Facility: HOSPITAL | Age: 63
LOS: 1 days | Discharge: HOME | End: 2018-10-03
Attending: EMERGENCY MEDICINE | Admitting: EMERGENCY MEDICINE
Payer: OTHER MISCELLANEOUS

## 2018-10-03 ENCOUNTER — OUTPATIENT (OUTPATIENT)
Dept: OUTPATIENT SERVICES | Facility: HOSPITAL | Age: 63
LOS: 1 days | End: 2018-10-03
Payer: COMMERCIAL

## 2018-10-03 VITALS
RESPIRATION RATE: 16 BRPM | DIASTOLIC BLOOD PRESSURE: 89 MMHG | HEART RATE: 80 BPM | SYSTOLIC BLOOD PRESSURE: 142 MMHG | TEMPERATURE: 98 F | OXYGEN SATURATION: 95 %

## 2018-10-03 DIAGNOSIS — Z92.89 PERSONAL HISTORY OF OTHER MEDICAL TREATMENT: Chronic | ICD-10-CM

## 2018-10-03 DIAGNOSIS — Z98.1 ARTHRODESIS STATUS: Chronic | ICD-10-CM

## 2018-10-03 DIAGNOSIS — M43.16 SPONDYLOLISTHESIS, LUMBAR REGION: ICD-10-CM

## 2018-10-03 DIAGNOSIS — Z00.8 ENCOUNTER FOR OTHER GENERAL EXAMINATION: ICD-10-CM

## 2018-10-03 PROBLEM — Z00.00 ENCOUNTER FOR PREVENTIVE HEALTH EXAMINATION: Status: ACTIVE | Noted: 2018-10-03

## 2018-10-03 PROBLEM — M54.10 RADICULOPATHY, SITE UNSPECIFIED: Chronic | Status: ACTIVE | Noted: 2018-09-14

## 2018-10-03 PROBLEM — N28.1 CYST OF KIDNEY, ACQUIRED: Chronic | Status: ACTIVE | Noted: 2018-09-14

## 2018-10-03 PROBLEM — M48.00 SPINAL STENOSIS, SITE UNSPECIFIED: Chronic | Status: ACTIVE | Noted: 2018-09-14

## 2018-10-03 PROBLEM — J30.9 ALLERGIC RHINITIS, UNSPECIFIED: Chronic | Status: ACTIVE | Noted: 2018-09-14

## 2018-10-03 PROBLEM — N28.9 DISORDER OF KIDNEY AND URETER, UNSPECIFIED: Chronic | Status: ACTIVE | Noted: 2018-09-14

## 2018-10-03 PROBLEM — I83.90 ASYMPTOMATIC VARICOSE VEINS OF UNSPECIFIED LOWER EXTREMITY: Chronic | Status: ACTIVE | Noted: 2018-09-14

## 2018-10-03 PROBLEM — M43.10 SPONDYLOLISTHESIS, SITE UNSPECIFIED: Chronic | Status: ACTIVE | Noted: 2018-09-14

## 2018-10-03 PROBLEM — L40.9 PSORIASIS, UNSPECIFIED: Chronic | Status: ACTIVE | Noted: 2018-09-14

## 2018-10-03 PROBLEM — I10 ESSENTIAL (PRIMARY) HYPERTENSION: Chronic | Status: ACTIVE | Noted: 2018-09-14

## 2018-10-03 PROBLEM — N20.0 CALCULUS OF KIDNEY: Chronic | Status: ACTIVE | Noted: 2018-09-14

## 2018-10-03 PROBLEM — M51.9 UNSPECIFIED THORACIC, THORACOLUMBAR AND LUMBOSACRAL INTERVERTEBRAL DISC DISORDER: Chronic | Status: ACTIVE | Noted: 2018-09-14

## 2018-10-03 PROBLEM — M19.90 UNSPECIFIED OSTEOARTHRITIS, UNSPECIFIED SITE: Chronic | Status: ACTIVE | Noted: 2018-09-14

## 2018-10-03 PROBLEM — M10.9 GOUT, UNSPECIFIED: Chronic | Status: ACTIVE | Noted: 2018-09-14

## 2018-10-03 LAB
ALBUMIN SERPL ELPH-MCNC: 4 G/DL — SIGNIFICANT CHANGE UP (ref 3.3–5)
ALP SERPL-CCNC: 119 U/L — SIGNIFICANT CHANGE UP (ref 40–120)
ALT FLD-CCNC: 19 U/L — SIGNIFICANT CHANGE UP (ref 4–41)
AST SERPL-CCNC: 20 U/L — SIGNIFICANT CHANGE UP (ref 4–40)
BASOPHILS # BLD AUTO: 0.04 K/UL — SIGNIFICANT CHANGE UP (ref 0–0.2)
BASOPHILS NFR BLD AUTO: 0.5 % — SIGNIFICANT CHANGE UP (ref 0–2)
BILIRUB SERPL-MCNC: 0.6 MG/DL — SIGNIFICANT CHANGE UP (ref 0.2–1.2)
BUN SERPL-MCNC: 31 MG/DL — HIGH (ref 7–23)
CALCIUM SERPL-MCNC: 9.2 MG/DL — SIGNIFICANT CHANGE UP (ref 8.4–10.5)
CHLORIDE SERPL-SCNC: 100 MMOL/L — SIGNIFICANT CHANGE UP (ref 98–107)
CO2 SERPL-SCNC: 26 MMOL/L — SIGNIFICANT CHANGE UP (ref 22–31)
CREAT SERPL-MCNC: 1.56 MG/DL — HIGH (ref 0.5–1.3)
EOSINOPHIL # BLD AUTO: 0.16 K/UL — SIGNIFICANT CHANGE UP (ref 0–0.5)
EOSINOPHIL NFR BLD AUTO: 2.1 % — SIGNIFICANT CHANGE UP (ref 0–6)
GLUCOSE SERPL-MCNC: 91 MG/DL — SIGNIFICANT CHANGE UP (ref 70–99)
HCT VFR BLD CALC: 41.3 % — SIGNIFICANT CHANGE UP (ref 39–50)
HGB BLD-MCNC: 13.8 G/DL — SIGNIFICANT CHANGE UP (ref 13–17)
IMM GRANULOCYTES # BLD AUTO: 0.02 # — SIGNIFICANT CHANGE UP
IMM GRANULOCYTES NFR BLD AUTO: 0.3 % — SIGNIFICANT CHANGE UP (ref 0–1.5)
INR BLD: 1.14 — SIGNIFICANT CHANGE UP (ref 0.88–1.17)
LYMPHOCYTES # BLD AUTO: 1.67 K/UL — SIGNIFICANT CHANGE UP (ref 1–3.3)
LYMPHOCYTES # BLD AUTO: 22.2 % — SIGNIFICANT CHANGE UP (ref 13–44)
MCHC RBC-ENTMCNC: 30.3 PG — SIGNIFICANT CHANGE UP (ref 27–34)
MCHC RBC-ENTMCNC: 33.4 % — SIGNIFICANT CHANGE UP (ref 32–36)
MCV RBC AUTO: 90.8 FL — SIGNIFICANT CHANGE UP (ref 80–100)
MONOCYTES # BLD AUTO: 0.84 K/UL — SIGNIFICANT CHANGE UP (ref 0–0.9)
MONOCYTES NFR BLD AUTO: 11.2 % — SIGNIFICANT CHANGE UP (ref 2–14)
NEUTROPHILS # BLD AUTO: 4.8 K/UL — SIGNIFICANT CHANGE UP (ref 1.8–7.4)
NEUTROPHILS NFR BLD AUTO: 63.7 % — SIGNIFICANT CHANGE UP (ref 43–77)
NRBC # FLD: 0 — SIGNIFICANT CHANGE UP
PLATELET # BLD AUTO: 176 K/UL — SIGNIFICANT CHANGE UP (ref 150–400)
PMV BLD: 9.7 FL — SIGNIFICANT CHANGE UP (ref 7–13)
POTASSIUM SERPL-MCNC: 4.3 MMOL/L — SIGNIFICANT CHANGE UP (ref 3.5–5.3)
POTASSIUM SERPL-SCNC: 4.3 MMOL/L — SIGNIFICANT CHANGE UP (ref 3.5–5.3)
PROT SERPL-MCNC: 7.7 G/DL — SIGNIFICANT CHANGE UP (ref 6–8.3)
PROTHROM AB SERPL-ACNC: 13.2 SEC — HIGH (ref 9.8–13.1)
RBC # BLD: 4.55 M/UL — SIGNIFICANT CHANGE UP (ref 4.2–5.8)
RBC # FLD: 12.4 % — SIGNIFICANT CHANGE UP (ref 10.3–14.5)
SODIUM SERPL-SCNC: 140 MMOL/L — SIGNIFICANT CHANGE UP (ref 135–145)
WBC # BLD: 7.53 K/UL — SIGNIFICANT CHANGE UP (ref 3.8–10.5)
WBC # FLD AUTO: 7.53 K/UL — SIGNIFICANT CHANGE UP (ref 3.8–10.5)

## 2018-10-03 PROCEDURE — 93970 EXTREMITY STUDY: CPT | Mod: 26

## 2018-10-03 PROCEDURE — 93970 EXTREMITY STUDY: CPT

## 2018-10-03 PROCEDURE — 99218: CPT

## 2018-10-03 RX ORDER — OXYCODONE AND ACETAMINOPHEN 5; 325 MG/1; MG/1
1 TABLET ORAL ONCE
Qty: 0 | Refills: 0 | Status: DISCONTINUED | OUTPATIENT
Start: 2018-10-03 | End: 2018-10-03

## 2018-10-03 RX ORDER — PANTOPRAZOLE SODIUM 20 MG/1
40 TABLET, DELAYED RELEASE ORAL
Qty: 0 | Refills: 0 | Status: DISCONTINUED | OUTPATIENT
Start: 2018-10-03 | End: 2018-10-07

## 2018-10-03 RX ORDER — MORPHINE SULFATE 50 MG/1
4 CAPSULE, EXTENDED RELEASE ORAL ONCE
Qty: 0 | Refills: 0 | Status: DISCONTINUED | OUTPATIENT
Start: 2018-10-03 | End: 2018-10-03

## 2018-10-03 RX ORDER — LANOLIN ALCOHOL/MO/W.PET/CERES
3 CREAM (GRAM) TOPICAL AT BEDTIME
Qty: 0 | Refills: 0 | Status: DISCONTINUED | OUTPATIENT
Start: 2018-10-03 | End: 2018-10-07

## 2018-10-03 RX ORDER — SERTRALINE 25 MG/1
25 TABLET, FILM COATED ORAL DAILY
Qty: 0 | Refills: 0 | Status: DISCONTINUED | OUTPATIENT
Start: 2018-10-03 | End: 2018-10-07

## 2018-10-03 RX ORDER — ENOXAPARIN SODIUM 100 MG/ML
70 INJECTION SUBCUTANEOUS ONCE
Qty: 0 | Refills: 0 | Status: COMPLETED | OUTPATIENT
Start: 2018-10-03 | End: 2018-10-03

## 2018-10-03 RX ORDER — GABAPENTIN 400 MG/1
300 CAPSULE ORAL AT BEDTIME
Qty: 0 | Refills: 0 | Status: DISCONTINUED | OUTPATIENT
Start: 2018-10-03 | End: 2018-10-07

## 2018-10-03 RX ORDER — SODIUM CHLORIDE 9 MG/ML
1000 INJECTION INTRAMUSCULAR; INTRAVENOUS; SUBCUTANEOUS ONCE
Qty: 0 | Refills: 0 | Status: COMPLETED | OUTPATIENT
Start: 2018-10-03 | End: 2018-10-03

## 2018-10-03 RX ADMIN — SERTRALINE 25 MILLIGRAM(S): 25 TABLET, FILM COATED ORAL at 22:36

## 2018-10-03 RX ADMIN — GABAPENTIN 300 MILLIGRAM(S): 400 CAPSULE ORAL at 22:36

## 2018-10-03 RX ADMIN — OXYCODONE AND ACETAMINOPHEN 1 TABLET(S): 5; 325 TABLET ORAL at 19:41

## 2018-10-03 RX ADMIN — SODIUM CHLORIDE 1000 MILLILITER(S): 9 INJECTION INTRAMUSCULAR; INTRAVENOUS; SUBCUTANEOUS at 17:52

## 2018-10-03 RX ADMIN — OXYCODONE AND ACETAMINOPHEN 1 TABLET(S): 5; 325 TABLET ORAL at 17:49

## 2018-10-03 RX ADMIN — OXYCODONE AND ACETAMINOPHEN 1 TABLET(S): 5; 325 TABLET ORAL at 19:43

## 2018-10-03 RX ADMIN — OXYCODONE AND ACETAMINOPHEN 1 TABLET(S): 5; 325 TABLET ORAL at 20:20

## 2018-10-03 RX ADMIN — ENOXAPARIN SODIUM 70 MILLIGRAM(S): 100 INJECTION SUBCUTANEOUS at 18:40

## 2018-10-03 NOTE — ED SUB INTERN NOTE - CARDIOVASCULAR [+], MLM
HEART FAILURE CARE MANAGER: Met with patient to establish a relationship and explain role  The patient had received the Heart Talk: Living with Heart Failure booklet and is engaged in doing what he can to follow his medical regimen  I did give him a scale and HF program business card 
PERIPHERAL EDEMA

## 2018-10-03 NOTE — ED CDU PROVIDER INITIAL DAY NOTE - OBJECTIVE STATEMENT
Pt is a 64 y/o M PMHx sarcoidosis, h/o spinal fusion on 9/10/18 with Dr. Neville sent for bilateral lower extremity DVT today.  Pt states he developed gradual onset left lower extremity swelling and calf pain for past four days.  Pt was evaluated by Dr. Neville today who directed pt to have duplex study which revealed "Bilateral calf and right popliteal vein (above the knee) DVT." Pt is a 62 y/o M PMHx sarcoidosis, h/o spinal fusion on 9/10/18 with Dr. Neville sent for bilateral lower extremity DVT today.  Pt states he developed gradual onset left lower extremity swelling and calf pain for past four days.  Pt was evaluated by Dr. Neville today who directed pt to have duplex study which revealed "Bilateral calf and right popliteal vein (above the knee) DVT."  Pt was then directed to ED by Dr. Neville for evaluation and management.  Pt denies any fevers, chills, chest pain, SOB, any new numbness or weakness.  Pt sent to CDU for Lovenox, Lovenox education and telemetry with plan for discharge in the morning.

## 2018-10-03 NOTE — ED ADULT NURSE NOTE - INTERVENTIONS DEFINITIONS
Provide visual clues: red socks/[place] to call system/Call bell, personal items and telephone within reach/Instruct patient to call for assistance

## 2018-10-03 NOTE — ED CDU PROVIDER INITIAL DAY NOTE - MEDICAL DECISION MAKING DETAILS
Pt is a 62 y/o M PMHx sarcoidosis, h/o spinal fusion on 9/10/18 with Dr. Neville sent for bilateral lower extremity DVT today -- bilateral lower extremity DVT w/o symptoms of PE -- telemetry, lovenox, lovenox education

## 2018-10-03 NOTE — ED CDU PROVIDER INITIAL DAY NOTE - ATTENDING CONTRIBUTION TO CARE
CDU MD MCMAHON:  I performed a face to face bedside interview with patient regarding history of present illness, review of symptoms and past medical history. I completed an independent physical exam.  I have discussed patient's plan of care with PA.   I agree with note as stated above, having amended the EMR as needed to reflect my findings. I have discussed the assessment and plan of care.  This includes during the time I functioned as the attending physician for this patient.    62 y/o male h/o recent spinal surg found to have b/l dvt's.  [facility]

## 2018-10-03 NOTE — ED ADULT NURSE REASSESSMENT NOTE - NS ED NURSE REASSESS COMMENT FT1
Received report from LOGAN Montes, pt AOX3 in no apparent distress, vital signs stable. Pt denies chest pain or SOB states pain has eased to be placed in CDU, will continue to monitor

## 2018-10-03 NOTE — ED ADULT NURSE NOTE - CHIEF COMPLAINT QUOTE
Patient arrives with ems from having a doppler today, s/p spinal surgery on 9/10/18, c/o pain behind his bilaterals knees , left leg swelling, , positive for bilateral  DVTs.

## 2018-10-03 NOTE — ED SUB INTERN NOTE - OBJECTIVE STATEMENT FT
62 y/o male w/ PMHx of HTN, psoriasis presents w/ b/l leg pain from b/l DVTs found on doppler at surgeons office today. He states the pain is located behind both legs, is non-radiating, has pain w/ motion, and extremely tender to palpation. Last night he noticed that his L leg was more swollen than the R. Pt had spinal fusion L4-L5 on 9/10/18 by Dr. Neville, spent 1 wk in the hospital, 2 wks inpatient rehab, recently released last Thursday (9/27). He states that he has been c/o b/l posterior calf pain since he has been brought to inpatient rehab, however his pain was dismissed as muscle pain from the PT he was doing. Pt denies any history of blood clots in the past.

## 2018-10-03 NOTE — ED ADULT TRIAGE NOTE - CHIEF COMPLAINT QUOTE
Patient arrives with ems from having a doppler today, s/p spinal surgery on 9/10/18, c/o pain behind his bilaterals knees , positive for bilateral  DVTs. Patient arrives with ems from having a doppler today, s/p spinal surgery on 9/10/18, c/o pain behind his bilaterals knees , left leg swelling, , positive for bilateral  DVTs.

## 2018-10-03 NOTE — ED ADULT NURSE NOTE - OBJECTIVE STATEMENT
Pt awake and alert arrives with left leg pain and swelling was sent over for b/l  lower leg dvt. Pt awaiting md evaluation and dispo. Pt denies sob or chest pain.

## 2018-10-03 NOTE — ED PROVIDER NOTE - OBJECTIVE STATEMENT
62 y/o M with h/o sarcoidosis s/p spinal fusion on 9/10/18 sent in from orthopedic surgeon office for bilateral DVT. Has been having bilateral LE pain with LLE swelling x 5 days. Pain is sharp, in calf area. Denies chest pain or SOB. Denies fever, chills. Has bilateral LE lesions secondary to psoriasis flare. Was discontinued from maintenance psoriasis medications for surgeries.

## 2018-10-03 NOTE — ED PROVIDER NOTE - PHYSICAL EXAMINATION
Gen:  alert, awake, no acute distress  HEENT:  atraumatic head, airway clear, pupils equal and round  CV:  rrr, nl S1, S2, no m/r/g  Pulm:  lungs CTA b/l  Abd: s/nt/nd, +BS  Ext:  moving all extremities, LLE swelling and bilateral tenderness  Neuro:  grossly intact, no focal deficits  Skin:  psoriasis on bilateral shins  Psych: AOx3, normal affect, no apparent risk to self or others

## 2018-10-03 NOTE — ED SUB INTERN NOTE - MEDICAL DECISION MAKING DETAILS
64 y/o male w/ PMHx of HTN, psoriasis presents w/ b/l leg pain due to b/l DVTs found on doppler at surgeons office today. Possible differential diagnosis includes, but is not limited to b/l DVT, PE (unlikely, given lack of c/o CP or SOB), cellulitis (given warmth and redness of L leg and vascular compromise). Labs: CBC, CMP, PT/PTT, INR. Considering starting pt on Lovenox injections pending Cr levels, if elevated consider starting pt on oral anti coag (i.e. Xarelto), or Heparin drip. Consider starting Amox-Clav for possible L leg cellulitis. Continue to observe and reassess pt.

## 2018-10-03 NOTE — ED SUB INTERN NOTE - PHYSICAL EXAMINATION
Constitutional: well appearing male, appears stated age, in mild distress  HEENT: normocephalic, atraumatic, EOMI, PERRL, moist oral mucosa. Neck supple, non-tender, full ROM  Heart: RRR, +s1/s2, no murmurs, rubs, gallops  Lungs: CTA b/l, no wheezes, rales, rhonchi  Abdomen: non-tender, non-distended, +BSx4  Extremities: L leg swelling > R leg, L leg redness and warmth anteriorly, tenderness to palpation behind the knee b/l (L>R), pedal pulses palpated b/l 2+, psoriatic changes present on legs b/l

## 2018-10-03 NOTE — ED ADULT NURSE REASSESSMENT NOTE - NS ED NURSE REASSESS COMMENT FT1
Pt awaiting dispo, tolerated dinner , states some relief of pain after medication. pain now 5.10. Pts family at bedside call bell in reach.

## 2018-10-03 NOTE — ED PROVIDER NOTE - MEDICAL DECISION MAKING DETAILS
64 y/o M s/p spinal fusion 3 weeks ago with bilateral DVT. Labs, anticoagulation (lovenox vs heparin) depending on renal function, admit

## 2018-10-03 NOTE — ED ADULT NURSE REASSESSMENT NOTE - NS ED NURSE REASSESS COMMENT FT1
Pt medicated for pain  . Pt denies any chest pain . Pt given call bell and instructed on use to call for assistance. Pt awaiting dispo. Pt denies any sob.

## 2018-10-03 NOTE — ED PROVIDER NOTE - ATTENDING CONTRIBUTION TO CARE
DR. MCMAHON, ATTENDING MD-  I performed a face to face bedside interview with patient regarding history of present illness, review of symptoms and past medical history. I completed an independent physical exam.  I have discussed patient's plan of care with the resident.   Documentation as above in the note.    64 y/o male h/o htn, psoriasis, recent lumbar fusion at [facility] on 9/10, has been attending rehab, here from surgeon's office after u/s today demonstrated b/l LE dvt's.  Has had b/l le pain x5 days with swelling b/l L>R.  Had dc'd psoriasis meds and has overlying psoriasis flare.  Denies f/c, ha, neck stiffness, cp, sob, cough, abd pain, n/v/d, dysuria.  Afebrile, vs wnl, anxious, ctabil, s1s2 rrr no m/r/g, abd soft non ttp no r/g, no cva tenderness b/l, leg swelling b/l L>R, scaling erythematous circular rashes over b/l le's without discharge or warmth.  Provoked DVT's, likely secondary to recent sx.  Obtain cbc, bmp, coags, anticoagulate, admit for continued therapy and PT.

## 2018-10-04 VITALS
OXYGEN SATURATION: 100 % | HEART RATE: 83 BPM | TEMPERATURE: 98 F | WEIGHT: 167.99 LBS | RESPIRATION RATE: 15 BRPM | HEIGHT: 67 IN | SYSTOLIC BLOOD PRESSURE: 129 MMHG | DIASTOLIC BLOOD PRESSURE: 79 MMHG

## 2018-10-04 PROCEDURE — 99217: CPT

## 2018-10-04 RX ORDER — OXYCODONE AND ACETAMINOPHEN 5; 325 MG/1; MG/1
1 TABLET ORAL ONCE
Qty: 0 | Refills: 0 | Status: DISCONTINUED | OUTPATIENT
Start: 2018-10-04 | End: 2018-10-04

## 2018-10-04 RX ORDER — FONDAPARINUX SODIUM 2.5 MG/.5ML
1 INJECTION, SOLUTION SUBCUTANEOUS
Qty: 42 | Refills: 0 | OUTPATIENT
Start: 2018-10-04 | End: 2018-10-24

## 2018-10-04 RX ORDER — RIVAROXABAN 15 MG-20MG
15 KIT ORAL
Qty: 0 | Refills: 0 | Status: DISCONTINUED | OUTPATIENT
Start: 2018-10-04 | End: 2018-10-07

## 2018-10-04 RX ADMIN — OXYCODONE AND ACETAMINOPHEN 1 TABLET(S): 5; 325 TABLET ORAL at 11:14

## 2018-10-04 RX ADMIN — SERTRALINE 25 MILLIGRAM(S): 25 TABLET, FILM COATED ORAL at 11:14

## 2018-10-04 RX ADMIN — PANTOPRAZOLE SODIUM 40 MILLIGRAM(S): 20 TABLET, DELAYED RELEASE ORAL at 06:55

## 2018-10-04 RX ADMIN — RIVAROXABAN 15 MILLIGRAM(S): KIT at 06:55

## 2018-10-04 NOTE — ED CDU PROVIDER DISPOSITION NOTE - PLAN OF CARE
See your primary care doctor within 24-48 hours, bring copies of all reports with you. START Xarelto 15mg one tablet twice a day with food. Ambulate as tolerated with use of your walker. Return to the ER for shortness of breath, chest pain or any other concerns.

## 2018-10-04 NOTE — ED CDU PROVIDER SUBSEQUENT DAY NOTE - HISTORY
Pt is a 62 y/o M PMHx sarcoidosis, h/o spinal fusion on 9/10/18 with Dr. Neville sent for bilateral lower extremity DVTs for lovenox Pt states he developed gradual onset left lower extremity swelling and calf pain for past four days. In the CDU, pt opted out of lovenox, states he does not like needles and had difficulty with embrel injections in the past. Xarelto started. Called [facility] pharmacy and confirmed $30 copay. Attempted to get a coupon card from Xarelto website, however website states pt does not qualify. Spoke w/ niece Nakia, states $30 copay is not a problem. Dr. Izaguirre left message for PMD to inform of results and initiation of Xarelto.  Pt admits to lower ext pain. Denies chest pain or SOB.

## 2018-10-04 NOTE — ED CDU PROVIDER SUBSEQUENT DAY NOTE - ATTENDING CONTRIBUTION TO CARE
I performed a face to face evaluation of this patient and obtained a history and performed a full exam.  I agree with the history, physical exam and plan of the PA.   62 y/o M s/p spinal stenosis surgery 9/10, subsequent Rehab, recently able to ambulate with walker.  Noted pain in b/l posterior lower extremities since at Rehab, and subsequently L>R lower leg swelling.  To Ortho MD yesterday, sent for duplex showing b/l DVTs.  Treated with lovenox in ED, however patient does not want to take injections.  To start on Xarelto.  To discuss with PMD Dr. Morrison [phone] - ppne message.

## 2018-10-04 NOTE — ED CDU PROVIDER SUBSEQUENT DAY NOTE - PROGRESS NOTE DETAILS
Pt resting comfortably.  Pt offers no acute complaints at this time.  Pt states he does not feel comfortable giving himself injections and would prefer to take oral anticoagulants.  Called [facility] who had pt's insurance on file.  As per [facility] pharmacist, copay for Xarelto 15 mg BID x 21 days would be $30.  Pt states he is comfortable with paying for that copay.  Will continue to observe pt on telemetry. Discussed pt request to stop lovenox and start oral anticoagulant with hospital pharmacist who states we may give Xarelto 15 mg BID starting at this time despite having received Lovenox yesterday evening.

## 2018-10-04 NOTE — ED CDU PROVIDER SUBSEQUENT DAY NOTE - MEDICAL DECISION MAKING DETAILS
62 y/o M here w/ yanna lower ext dvt, no sob/cp. Pt opted for xarelto. Will dc w/ xarelto rx today. Already has PT scheduled by his surgeon

## 2018-10-04 NOTE — ED CDU PROVIDER DISPOSITION NOTE - CLINICAL COURSE
Dmitriy Mulligan I spoke to PMD Dr. Morrison.  Agrees with plan.  Patient to continue Xarelto.  Stable for discharge.

## 2018-10-05 ENCOUNTER — EMERGENCY (EMERGENCY)
Facility: HOSPITAL | Age: 63
LOS: 1 days | Discharge: HOME | End: 2018-10-05
Attending: EMERGENCY MEDICINE
Payer: COMMERCIAL

## 2018-10-05 VITALS
HEART RATE: 86 BPM | RESPIRATION RATE: 20 BRPM | TEMPERATURE: 98 F | SYSTOLIC BLOOD PRESSURE: 139 MMHG | DIASTOLIC BLOOD PRESSURE: 88 MMHG | OXYGEN SATURATION: 95 %

## 2018-10-05 VITALS
HEART RATE: 82 BPM | OXYGEN SATURATION: 97 % | TEMPERATURE: 98 F | SYSTOLIC BLOOD PRESSURE: 130 MMHG | RESPIRATION RATE: 16 BRPM | DIASTOLIC BLOOD PRESSURE: 70 MMHG

## 2018-10-05 DIAGNOSIS — Z98.1 ARTHRODESIS STATUS: Chronic | ICD-10-CM

## 2018-10-05 DIAGNOSIS — Z92.89 PERSONAL HISTORY OF OTHER MEDICAL TREATMENT: Chronic | ICD-10-CM

## 2018-10-05 LAB
ANION GAP SERPL CALC-SCNC: 12 MMOL/L — SIGNIFICANT CHANGE UP (ref 5–17)
APPEARANCE UR: CLEAR — SIGNIFICANT CHANGE UP
BASOPHILS # BLD AUTO: 0 K/UL — SIGNIFICANT CHANGE UP (ref 0–0.2)
BASOPHILS NFR BLD AUTO: 0.5 % — SIGNIFICANT CHANGE UP (ref 0–2)
BILIRUB UR-MCNC: NEGATIVE — SIGNIFICANT CHANGE UP
BUN SERPL-MCNC: 27 MG/DL — HIGH (ref 7–23)
CALCIUM SERPL-MCNC: 9.1 MG/DL — SIGNIFICANT CHANGE UP (ref 8.4–10.5)
CHLORIDE SERPL-SCNC: 101 MMOL/L — SIGNIFICANT CHANGE UP (ref 96–108)
CO2 SERPL-SCNC: 22 MMOL/L — SIGNIFICANT CHANGE UP (ref 22–31)
COLOR SPEC: SIGNIFICANT CHANGE UP
CREAT SERPL-MCNC: 1.47 MG/DL — HIGH (ref 0.5–1.3)
DIFF PNL FLD: NEGATIVE — SIGNIFICANT CHANGE UP
EOSINOPHIL # BLD AUTO: 0.1 K/UL — SIGNIFICANT CHANGE UP (ref 0–0.5)
EOSINOPHIL NFR BLD AUTO: 1.3 % — SIGNIFICANT CHANGE UP (ref 0–6)
GLUCOSE SERPL-MCNC: 133 MG/DL — HIGH (ref 70–99)
GLUCOSE UR QL: NEGATIVE — SIGNIFICANT CHANGE UP
HCT VFR BLD CALC: 39.5 % — SIGNIFICANT CHANGE UP (ref 39–50)
HGB BLD-MCNC: 13.4 G/DL — SIGNIFICANT CHANGE UP (ref 13–17)
KETONES UR-MCNC: NEGATIVE — SIGNIFICANT CHANGE UP
LEUKOCYTE ESTERASE UR-ACNC: NEGATIVE — SIGNIFICANT CHANGE UP
LYMPHOCYTES # BLD AUTO: 1.3 K/UL — SIGNIFICANT CHANGE UP (ref 1–3.3)
LYMPHOCYTES # BLD AUTO: 21.1 % — SIGNIFICANT CHANGE UP (ref 13–44)
MCHC RBC-ENTMCNC: 30.7 PG — SIGNIFICANT CHANGE UP (ref 27–34)
MCHC RBC-ENTMCNC: 34.1 GM/DL — SIGNIFICANT CHANGE UP (ref 32–36)
MCV RBC AUTO: 90.1 FL — SIGNIFICANT CHANGE UP (ref 80–100)
MONOCYTES # BLD AUTO: 0.5 K/UL — SIGNIFICANT CHANGE UP (ref 0–0.9)
MONOCYTES NFR BLD AUTO: 9 % — SIGNIFICANT CHANGE UP (ref 2–14)
NEUTROPHILS # BLD AUTO: 4.2 K/UL — SIGNIFICANT CHANGE UP (ref 1.8–7.4)
NEUTROPHILS NFR BLD AUTO: 68.1 % — SIGNIFICANT CHANGE UP (ref 43–77)
NITRITE UR-MCNC: NEGATIVE — SIGNIFICANT CHANGE UP
PH UR: 6.5 — SIGNIFICANT CHANGE UP (ref 5–8)
PLATELET # BLD AUTO: 175 K/UL — SIGNIFICANT CHANGE UP (ref 150–400)
POTASSIUM SERPL-MCNC: 4.1 MMOL/L — SIGNIFICANT CHANGE UP (ref 3.5–5.3)
POTASSIUM SERPL-SCNC: 4.1 MMOL/L — SIGNIFICANT CHANGE UP (ref 3.5–5.3)
PROT UR-MCNC: NEGATIVE — SIGNIFICANT CHANGE UP
RBC # BLD: 4.38 M/UL — SIGNIFICANT CHANGE UP (ref 4.2–5.8)
RBC # FLD: 11.9 % — SIGNIFICANT CHANGE UP (ref 10.3–14.5)
SODIUM SERPL-SCNC: 135 MMOL/L — SIGNIFICANT CHANGE UP (ref 135–145)
SP GR SPEC: 1.01 — SIGNIFICANT CHANGE UP (ref 1.01–1.02)
UROBILINOGEN FLD QL: NEGATIVE — SIGNIFICANT CHANGE UP
WBC # BLD: 6.1 K/UL — SIGNIFICANT CHANGE UP (ref 3.8–10.5)
WBC # FLD AUTO: 6.1 K/UL — SIGNIFICANT CHANGE UP (ref 3.8–10.5)

## 2018-10-05 PROCEDURE — 81003 URINALYSIS AUTO W/O SCOPE: CPT

## 2018-10-05 PROCEDURE — 99284 EMERGENCY DEPT VISIT MOD MDM: CPT

## 2018-10-05 PROCEDURE — 99284 EMERGENCY DEPT VISIT MOD MDM: CPT | Mod: 25

## 2018-10-05 PROCEDURE — 99053 MED SERV 10PM-8AM 24 HR FAC: CPT

## 2018-10-05 PROCEDURE — 71045 X-RAY EXAM CHEST 1 VIEW: CPT | Mod: 26

## 2018-10-05 PROCEDURE — 85652 RBC SED RATE AUTOMATED: CPT

## 2018-10-05 PROCEDURE — 71045 X-RAY EXAM CHEST 1 VIEW: CPT

## 2018-10-05 PROCEDURE — 85027 COMPLETE CBC AUTOMATED: CPT

## 2018-10-05 PROCEDURE — 72100 X-RAY EXAM L-S SPINE 2/3 VWS: CPT

## 2018-10-05 PROCEDURE — 72100 X-RAY EXAM L-S SPINE 2/3 VWS: CPT | Mod: 26

## 2018-10-05 PROCEDURE — 80048 BASIC METABOLIC PNL TOTAL CA: CPT

## 2018-10-05 RX ORDER — MORPHINE SULFATE 50 MG/1
4 CAPSULE, EXTENDED RELEASE ORAL ONCE
Qty: 0 | Refills: 0 | Status: DISCONTINUED | OUTPATIENT
Start: 2018-10-05 | End: 2018-10-05

## 2018-10-05 NOTE — ED PROVIDER NOTE - ATTENDING CONTRIBUTION TO CARE
ATTENDING MD:  I, Tanvir Christine, personally have seen and examined this patient.  I have discussed all aspects of care with the resident physician. Resident note reviewed and agree on plan of care and except where noted.  See HPI, PE, and MDM for details.    well appearing, no apparent distress, pain out of proportion to presentation, heart normal rate, regular rhythm, lungs clear to auscultation bilaterally, equal breath sounds bilaterally, abd soft, nontender,, bladder decompressed. strength 5/5 throughout except 4/5 L plantar flxion. negaitve straight leg raise bilaterally, no objective sensory deficits but subjectively decreased sensation on LLE. no midline back or neck tenderness, no tenderness to percussion over spine. skin warm, dry no rash. surgical site on back C/D/I    MDM: pt with subjective sensory deficits ne foot ddrop after recent spine surgery/decompression. chills but no objective signs of infeection. CBC/bmp/ESR/CRP/spien consult. pain control. reassess.

## 2018-10-05 NOTE — ED PROVIDER NOTE - PROGRESS NOTE DETAILS
Tania PGY1 MDM- discussion with spine team. They will see patient here in ED. Patrick SERVIN: Patient signed out by Dr. Christine, pending spine consult. Patient evaluated by ortho who is covering spine and state no acute intervention. Patient walking about ED and stable for d/c home. Patrick SERVIN: Patient signed out by Dr. Christine, pending spine consult. Patient evaluated by ortho who is covering spine and state no acute intervention. Patient walking about ED, asked to follow up with his surgeon and pcp. I advised to call surgeon today and let him know of visit. Pt reports compliance with xarelto for DVT. Exam: NAD, anxious, RRR, lungs CTA, abd soft, nt, nd. He is stable for d/c home.

## 2018-10-05 NOTE — ED PROVIDER NOTE - PLAN OF CARE
Thank you for visiting our Emergency Department, it has been a pleasure taking part in your healthcare.    Your discharge diagnosis is: weakness  Please take all discharge medications as indicated below:  Please continue all medications as rx'd by your PMD.  Please follow up with your PMD within x48 hours.  Please follow up with orthopedics/neurosurg within x48 hours.  A copy of resulted labs, imaging, and findings have been provided to you.   You have had a detailed discussion with your provider regarding your diagnosis, care management and discharge planning including, but not limited to: return precautions, follow up visits with existing or new providers, new prescriptions and/or medication changes, wound and/or spint/cast care or other care   aspects specific to your diagnosis and treatment. You have been given the opportunity to have your questions answered. At this time you have been deemed stable and fit for discharge.  Return precautions to the Emergency Department include but are not limited to: unrelenting nausea, vomiting, fever, chills, chest pain, shortness of breath, dizziness, chest or abdominal pain, worsening back pain, syncope, blood in urine or stool, headache that doesn't resolve, numbness or tingling, loss of sensation, loss of motor function, or any other concerning symptoms.

## 2018-10-05 NOTE — ED PROVIDER NOTE - PHYSICAL EXAMINATION
PHYSICAL EXAM:  GENERAL: NAD, well-groomed, well-developed  HEAD:  Atraumatic, Normocephalic  EYES: EOMI, PERRLA, conjunctiva and sclera clear  ENMT: No tonsillar erythema, exudates, or enlargement; Moist mucous membranes  NECK: Supple, No JVD  HEART: Regular rate and rhythm; No murmurs, rubs, or gallops  RESPIRATORY: CTA B/L, No W/R/R  ABDOMEN: Soft, Nontender, Nondistended  BACK: no cva or midline tenderness, fresh bandages to lumbar region, CDI, no erythema or drainage  NEURO: A&Ox3, foot drop on L, 4+/5 on plantarflexion, normal strength at knee/hip, no other focal findings, PERRL, cranial nerves intact  EXTREMITIES:  2+ Peripheral Pulses, No clubbing, cyanosis, or edema  SKIN: warm, dry, psoriatic rash to BLE

## 2018-10-05 NOTE — CONSULT NOTE ADULT - SUBJECTIVE AND OBJECTIVE BOX
CHIEF COMPLAINT:     HPI: Pt is a 63y coming in for fevers and recent DVT diagnosed on previous visit. Patient is one month our from posterior lumber fusion. Patient states his back pain has improved since surgery and that is not why he comes to the emergency room. Patient has some numbness and weakness in the left foot which is unchanged from pre and post operative. No new neurologic complaints, patient denies changes in bowel or bladder    PAST MEDICAL & SURGICAL HISTORY:  Spinal stenosis  Radiculopathy  Spondylolisthesis  Varicosities of leg  Cyst of left kidney  Renal insufficiency  Psoriasis  Osteoarthritis  Nephrolithiasis  Lumbar disc disease  Gouty arthritis  Essential hypertension  Allergic rhinitis  S/P lumbar fusion  History of dental surgery    FAMILY HISTORY:  No pertinent family history in first degree relatives    SOCIAL HISTORY:   Vital Signs Last 24 Hrs  T(C): 36.9 (05 Oct 2018 07:39), Max: 36.9 (05 Oct 2018 07:39)  T(F): 98.4 (05 Oct 2018 07:39), Max: 98.4 (05 Oct 2018 07:39)  HR: 82 (05 Oct 2018 07:39) (78 - 86)  BP: 130/70 (05 Oct 2018 07:39) (126/73 - 139/88)  BP(mean): --  RR: 16 (05 Oct 2018 07:39) (16 - 20)  SpO2: 97% (05 Oct 2018 07:39) (95% - 97%)  I&O's Detail    LABS:                 13.4   6.1   )-----------( 175      ( 05 Oct 2018 03:52 )             39.5     10-05  135  |  101  |  27<H>  ----------------------------<  133<H>  4.1   |  22  |  1.47<H>  Ca    9.1      05 Oct 2018 03:52  TPro  7.7  /  Alb  4.0  /  TBili  0.6  /  DBili  x   /  AST  20  /  ALT  19  /  AlkPhos  119  10-03  PT/INR - ( 03 Oct 2018 16:55 )   PT: 13.2 SEC;   INR: 1.14       Urinalysis Basic - ( 05 Oct 2018 07:50 )  Color: Light Yellow / Appearance: Clear / S.011 / pH: x  Gluc: x / Ketone: Negative  / Bili: Negative / Urobili: Negative   Blood: x / Protein: Negative / Nitrite: Negative   Leuk Esterase: Negative / RBC: x / WBC x   Sq Epi: x / Non Sq Epi: x / Bacteria: x    RADIOLOGY & ADDITIONAL STUDIES:    PHYSICAL EXAM:  General; Awake and alert, Oriented x 3  Hips/Pelvis:   ABle to SLR bilaterally. Negative log roll, heel strike. No pain on passive Int/Ext hip rotation.  Spine exam:  Neck: supple, NT, Full Painless baseline AROM  Back:   Spine:                       Motor exam:          Right Upper extremity: Delt 5/5, Biceps 5/5, Triceps 5/5, Wrist Ext 5/5, Wrist Flexion 5/5,  Strength 5/5         SILT C5-S1, No Clonus, Negative Price, +RP, Compartments soft           Left Upper extremity: Delt 5/5, Biceps 5/5, Triceps 5/5, Wrist Ext 5/5, Wrist Flexion 5/5,  Strength 5/5         SILT C5-S1, No Clonus, Negative Price, +RP, Compartments soft           Right Lower Extremity: Hip Flexion 5/5, Hip Extension 5/5, Knee Flexion 5/5, Knee Extension 5/5, Ankle Dorsiflexion 4/5,          Ankle Plantar Flexion 5/5, Extensor hallucis Longus 4/5         Decreased sensation L4/5 unchanged from preop/post op, No pain with SLR, Negative Clonus, Negative Babinski                  Left Lower Extremity: Hip Flexion 5/5, Hip Extension 5/5, Knee Flexion 5/5, Knee Extension 5/5, Ankle Dorsiflexion 5/5,          Ankle Plantar Flexion 5/5, Extensor hallucis Longus 5/5         SILT L2-S1, No pain with SLR, Negative Clonus, Negative Babinski                                            A/P :    Pain control  continue medical management for dvt, coordinate with patients primary surgeon for recommendations  WBAT  stable for discharge with follow up with Spine surgeon within 1 week from discharge  No change in neurologic status since preoperative and post operative  No further orthopedic workup  No need to follow up with on call Ortho Spine as patient already has Spine surgeon that he sees regularly  ESR normal, no concern for post opertive infection at this time

## 2018-10-05 NOTE — ED PROVIDER NOTE - OBJECTIVE STATEMENT
63 yoM with hx of psoriasis, recent l4/l5 fusion on 9/10, recent b/l dvt dx on 10/3 started on xarelto presents to ED with complaint of general weakness and chills. Says he also felt hot. No recorded fever. Just discharged from CDU on 10/4. Still with pain to LLE. Back pain improved. Recently saw Orlando Neville (spine surgeon) and had stitches removed, no problem at that time. Patient states his sx today just started during the day of Thursday (10/4). No cough, chest pain, headache, neck pain, or urinary bowel changes. No incontinence.

## 2018-10-05 NOTE — ED ADULT NURSE NOTE - OBJECTIVE STATEMENT
0205 pt 63ym bib ems, c/o infected tooth and "not satisfied w/ lij on dispo" vss able to verbalize concerns/pending adelita/gcruz

## 2018-10-05 NOTE — ED PROVIDER NOTE - CARE PLAN
Principal Discharge DX:	Weakness  Assessment and plan of treatment:	Thank you for visiting our Emergency Department, it has been a pleasure taking part in your healthcare.    Your discharge diagnosis is: weakness  Please take all discharge medications as indicated below:  Please continue all medications as rx'd by your PMD.  Please follow up with your PMD within x48 hours.  Please follow up with orthopedics/neurosurg within x48 hours.  A copy of resulted labs, imaging, and findings have been provided to you.   You have had a detailed discussion with your provider regarding your diagnosis, care management and discharge planning including, but not limited to: return precautions, follow up visits with existing or new providers, new prescriptions and/or medication changes, wound and/or spint/cast care or other care   aspects specific to your diagnosis and treatment. You have been given the opportunity to have your questions answered. At this time you have been deemed stable and fit for discharge.  Return precautions to the Emergency Department include but are not limited to: unrelenting nausea, vomiting, fever, chills, chest pain, shortness of breath, dizziness, chest or abdominal pain, worsening back pain, syncope, blood in urine or stool, headache that doesn't resolve, numbness or tingling, loss of sensation, loss of motor function, or any other concerning symptoms.

## 2018-10-05 NOTE — ED PROVIDER NOTE - CHIEF COMPLAINT
The patient is a 63y Male complaining of chills The patient is a 63y Male complaining of chills  and back pain

## 2018-10-05 NOTE — ED ADULT NURSE REASSESSMENT NOTE - NS ED NURSE REASSESS COMMENT FT1
Received pt from Kwame FORD. Pt is resting comfortably in bed. Pt states he has pain to LLE tolerable at this time. No redness/swelling noted to LLE. Resp even, unlabored. No resp distress noted. VSS. NAD noted. Will continue to monitor. Pending XRay Will continue to monitor.

## 2018-10-05 NOTE — ED PROVIDER NOTE - NS ED ROS FT
Constitutional: + chills, -   HEENT: denies visual changes, hearing changes, rhinitis, odynophagia, or dysphagia  Cardiovascular: denies palpitations, chest pain, edema  Respiratory: denies SOB, wheezing  Gastrointestinal: denies N/V/D, abdominal pain, hematochezia, melena  : denies dysuria, hematuria  MSK: denies weakness, joint pain  Neuro: no numbness or tingling  Psych: no depression or anxiety  Skin: denies new rashes or masses Constitutional: + chills, - fever  HEENT: denies visual changes, hearing changes, rhinitis, odynophagia, or dysphagia  Cardiovascular: denies palpitations, chest pain, edema  Respiratory: denies SOB, wheezing  Gastrointestinal: denies N/V/D, abdominal pain, hematochezia, melena  : denies dysuria, hematuria  MSK: + LLE pain, - back pain  Neuro: + LLE tingling, - headache  Psych: no depression or anxiety  Skin: BLE rash, psoriatic, no open wounds Constitutional: + chills, - fever  HEENT: denies visual changes, hearing changes, rhinitis, odynophagia, or dysphagia  Cardiovascular: denies palpitations, chest pain, edema  Respiratory: denies SOB, wheezing  Gastrointestinal: denies N/V/D, abdominal pain, hematochezia, melena  : denies dysuria, hematuria  MSK: + LLE pain, - back pain  Neuro: + LLE tingling, - headache, no weakness, no megan numbness  Psych: no depression or anxiety  Skin: BLE rash, psoriatic, no open wounds

## 2018-10-05 NOTE — ED PROVIDER NOTE - MEDICAL DECISION MAKING DETAILS
Tania PGY1 MDM recent lumbar fusion on 9/10 with Orlando De La Vega, dx with b/l DVT on 10/3, now on xarelto, here with new chills, no fever, back pain improved, still with radiating LLE pain, no incontinence, no saddle anesthesia, just had stitches removed  exam normal other than mild foot drop, 4+/5 on plantarflexion on L, otherwise non focal, afebrile  basic labs/ESR, CXR to r/o PNA, plan to consult spine and have pt f/u with Dr. De La Vega Tania PGY1 MDM recent lumbar fusion on 9/10 with Orlando De La Vega, dx with b/l DVT on 10/3, now on xarelto, here with new chills, no fever, back pain improved, still with radiating LLE pain, no incontinence, no saddle anesthesia, just had stitches removed  exam normal other than mild foot drop, ESR, CRP, spine consult. no expected benefit to XR or CT, MRI PRN per spine consult. pain control

## 2021-01-26 NOTE — ED ADULT NURSE NOTE - NSFALLRSKHRMRISKTYPE_ED_ALL_ED
[FreeTextEntry1] : Telehealth done with patient due to COVID19\par \par Patient has not been here since May 2019 t- baby girl is now 2 years old in march\par Since that visit she had proteinuria/hematuria which was persistent post partum 3 mos later; I had recommended a renal biopsy; she had decided to defer it at that time due to work and having young kids.\par Also had recommended to see Urology for the complex cyst, for which she saw Dr Leonard and was recommended MRI which she did not do for the same above reasons. Last renal sono 2019; \par \par She is 29 weeks now\par LMP: June 29\par GEOVANY: April 14\par 1st pregnany 2019; Delivered 40 weeks via vaginal delivery; no PEC\par 2nd pregnancy: States she still has proteinuria this pregnancy; Is anemic; \par OB is DR Oleary- ALL about women in [place]- \par Saw [facility] Jan 6th; Has growth scan scheduled Feb 10th with [facility]\par \par NO BP issues; Does not check BP at homes; At MD visits 120/80 BP; \par \par Meds: Synthroid 25mcg daily; Slow Fe daily; prenatal vitamin, \par \par ROS: no burining pain with urination; no foamy urine or hematuria; no leg swelling;  other ros neg; working in [employer] office\par 
other

## 2021-12-07 NOTE — ED ADULT NURSE NOTE - GASTROINTESTINAL ASSESSMENT
FIRST UROLOGY CONSULT      Patient Identification:  NAME:  Danyel Dash  Age:  77 y.o.   Sex:  male   :  1944   MRN:  [id number]       Chief complaint: retention    History of present illness:  77 years old  Admitted for cholecystitis  cholecystectomy today (less than 12 hours ago)  Retention  Previous ortez removed  No voiding issues preop  Small left renal lesion 1.9 cm  Will need further imaging after recovery  Creat 1.2      Past medical history:  Past Medical History:   Diagnosis Date   • Anemia    • Anxiety    • Arthritis    • Chronic kidney disease     CKD stage 3   • Coronary artery disease     rheumatic fever at age 8   • Diabetes mellitus (HCC)    • GERD (gastroesophageal reflux disease)    • GI bleed    • Hyperlipidemia    • Hypertension    • Low back pain    • Peripheral neuropathy    • Rheumatic fever    • Spinal stenosis, lumbar        Past surgical history:  Past Surgical History:   Procedure Laterality Date   • CATARACT EXTRACTION Bilateral    • COLONOSCOPY N/A 2016    tics, IH, polyps   • ENDOSCOPY N/A 2016    LA Grade B reflux esophagitis, erythematous mucosa in stomach   • ENDOSCOPY N/A 2017    Two jejunal AVM's    • ENTEROSCOPY SMALL BOWEL  2017    Procedure: ENTEROSCOPY SMALL BOWEL;  Surgeon: Anil Prakash MD;  Location: [facility] ENDOSCOPY;  Service:    • EPIDURAL BLOCK     • KNEE ARTHROPLASTY, PARTIAL REPLACEMENT Left 2021   • KNEE ARTHROSCOPY Right    • SHOULDER ARTHROSCOPY Right    • TONSILLECTOMY         Allergies:  Patient has no known allergies.    Home medications:  Medications Prior to Admission   Medication Sig Dispense Refill Last Dose   • acetaminophen (TYLENOL) 325 MG tablet Take 650 mg by mouth Every 6 (Six) Hours As Needed for Mild Pain .      • amLODIPine (NORVASC) 10 MG tablet Take 1 tablet by mouth Daily. 90 tablet 3    • aspirin 81 MG EC tablet Take 81 mg by mouth Daily.      • Blood Gluc Meter Disp-Strips device Pt to monitor blood  glucose two times daily 1 each 0    • buPROPion XL (WELLBUTRIN XL) 300 MG 24 hr tablet Take 1 tablet by mouth Daily. 90 tablet 3    • Cholecalciferol (VITAMIN D-3) 1000 UNITS capsule Take 1,000 Units by mouth Daily.      • Diclofenac Sodium (VOLTAREN) 1 % gel gel       • ferrous gluconate (FERGON) 240 (27 FE) MG tablet 480 mg.      • fluticasone (FLONASE) 50 MCG/ACT nasal spray 2 sprays into each nostril Daily. 15.8 mL 0    • hydrOXYzine (ATARAX) 25 MG tablet TAKE ONE TABLET BY MOUTH THREE TIMES A DAY AS NEEDED FOR ANXIETY. 90 tablet 0    • lisinopril (PRINIVIL,ZESTRIL) 10 MG tablet Take 1 tablet by mouth Daily. 90 tablet 3    • loratadine (CLARITIN) 10 MG tablet 10 mg.      • pantoprazole (Protonix) 40 MG EC tablet Take 1 tablet by mouth Daily. 90 tablet 0    • pioglitazone (ACTOS) 45 MG tablet Take 1 tablet by mouth Daily. 90 tablet 3    • simvastatin (ZOCOR) 20 MG tablet Take 1 tablet by mouth Daily. 90 tablet 3    • sucralfate (Carafate) 1 g tablet Take 1 tablet by mouth 4 (Four) Times a Day As Needed (stomach pain) for up to 7 days. 28 tablet 0    • traMADol (ULTRAM) 50 MG tablet       • vitamin B-12 (CYANOCOBALAMIN) 1000 MCG tablet Take 1,000 mcg by mouth Daily.      • vitamin C (ASCORBIC ACID) 500 MG tablet Take 500 mg by mouth Daily.           Hospital medications:  amLODIPine, 10 mg, Oral, Daily  ascorbic acid, 500 mg, Oral, Daily  aspirin, 81 mg, Oral, Daily  atorvastatin, 10 mg, Oral, Nightly  buPROPion XL, 300 mg, Oral, Daily  cetirizine, 10 mg, Oral, Daily  cholecalciferol, 1,000 Units, Oral, Daily  fluticasone, 2 spray, Nasal, Daily  insulin lispro, 0-9 Units, Subcutaneous, TID AC  lisinopril, 10 mg, Oral, Daily  [START ON 12/7/2021] pantoprazole, 40 mg, Oral, QAM  piperacillin-tazobactam, 3.375 g, Intravenous, Q8H  vitamin B-12, 1,000 mcg, Oral, Daily      Pharmacy to Dose Zosyn,   sodium chloride, 50 mL/hr, Last Rate: 50 mL/hr (12/06/21 2404)      •  acetaminophen  •  aluminum-magnesium  hydroxide-simethicone  •  dextrose  •  dextrose  •  glucagon (human recombinant)  •  HYDROmorphone  •  hydrOXYzine  •  nitroglycerin  •  ondansetron **OR** ondansetron  •  oxyCODONE-acetaminophen  •  Pharmacy to Dose Zosyn  •  sodium chloride    Family history:  Family History   Problem Relation Age of Onset   • Diverticulosis Father    • Diverticulitis Father    • Alzheimer's disease Mother    • No Known Problems Sister        Social history:  Social History     Tobacco Use   • Smoking status: Former Smoker     Packs/day: 1.50     Years: 18.00     Pack years: 27.00     Types: Cigarettes     Quit date:      Years since quittin.9   • Smokeless tobacco: Former User   • Tobacco comment: quit    Vaping Use   • Vaping Use: Never used   Substance Use Topics   • Alcohol use: No   • Drug use: No       Review of systems:    gen neg  Skin neg  Musculoskeletal neg  Endocrine neg  Heart no CP  Pulm no SOB  GI cholecystectomy today   retention, small renal lesion 1.9 cm?  Psych neg  Neuro neg        Objective:  TMax 24 hours:   Temp (24hrs), Av.9 °F (36.6 °C), Min:97.6 °F (36.4 °C), Max:98.2 °F (36.8 °C)      Vitals Ranges:   Temp:  [97.6 °F (36.4 °C)-98.2 °F (36.8 °C)] 97.6 °F (36.4 °C)  Heart Rate:  [74-97] 83  Resp:  [16-18] 18  BP: (123-165)/(55-89) 138/68    Intake/Output Last 3 shifts:  I/O last 3 completed shifts:  In: 1150 [I.V.:900; IV Piggyback:250]  Out: 650 [Urine:600; Blood:50]     Physical Exam:    General Appearance:    Alert, cooperative, NAD   HEENT:    No trauma, pupils reactive, hearing intact   Back:     No CVA tenderness   Lungs:     Respirations unlabored, no wheezing    Heart:    RRR, intact peripheral pulses   Abdomen:     Soft, NDNT, no masses, no guarding   :    Testes descended bilaterally, no nodules.  Penis normal.  No scrotal or penile rashes noted   Extremities:   No edema, no deformity   Lymphatic:   No neck or groin LAD   Skin:   No bleeding, bruising or rashes   Neuro/Psych:    Orientation intact, mood/affect pleasant, no focal findings       Results review:   I reviewed the patient's new clinical results.    Data review:  Lab Results (last 24 hours)     Procedure Component Value Units Date/Time    POC Glucose Once [[id number]]  (Abnormal) Collected: 12/06/21 1654    Specimen: Blood Updated: 12/06/21 1656     Glucose 187 mg/dL      Comment: RN Notified R and V Meter: [id number] : [id number] Zion ALMENDAREZ       Tissue Pathology Exam [[id number]] Collected: 12/06/21 1017    Specimen: Tissue from Gallbladder Updated: 12/06/21 1132    POC Glucose Once [[id number]]  (Abnormal) Collected: 12/06/21 1108    Specimen: Blood Updated: 12/06/21 1110     Glucose 158 mg/dL      Comment: Meter: [id number] : [id number] Alana Villarreal RN       COVID PRE-OP / PRE-PROCEDURE SCREENING ORDER (NO ISOLATION) - Swab, Nasopharynx [[id number]]  (Normal) Collected: 12/06/21 0130    Specimen: Swab from Nasopharynx Updated: 12/06/21 0237    Narrative:      The following orders were created for panel order COVID PRE-OP / PRE-PROCEDURE SCREENING ORDER (NO ISOLATION) - Swab, Nasopharynx.  Procedure                               Abnormality         Status                     ---------                               -----------         ------                     COVID-19,[facility]...[[id number]]  Normal              Final result                 Please view results for these tests on the individual orders.    COVID-19,[facility]HOU[facility] CEPHEID/PRIYANKA NP SWAB IN TRANSPORT MEDIA 8-12 HR TAT - Swab, Nasopharynx [[id number]]  (Normal) Collected: 12/06/21 0130    Specimen: Swab from Nasopharynx Updated: 12/06/21 0237     COVID19 Not Detected    Narrative:      Fact sheet for providers: https://www.fda.gov/media/279160/download    Fact sheet for patients: https://www.fda.gov/media/439842/download    Test performed by PCR.    Lactic Acid, Plasma [[id number]]  (Normal) Collected: 12/06/21 0121    Specimen: Blood Updated: 12/06/21  0152     Lactate 1.0 mmol/L     Blood Culture - Blood, Hand, Right [[id number]] Collected: 12/06/21 0121    Specimen: Blood from Hand, Right Updated: 12/06/21 0131    Blood Culture - Blood, Hand, Left [[id number]] Collected: 12/06/21 0126    Specimen: Blood from Hand, Left Updated: 12/06/21 0131    Troponin [[id number]]  (Normal) Collected: 12/05/21 1941    Specimen: Blood Updated: 12/05/21 2227     Troponin T <0.010 ng/mL     Narrative:      Troponin T Reference Range:  <= 0.03 ng/mL-   Negative for AMI  >0.03 ng/mL-     Abnormal for myocardial necrosis.  Clinicians would have to utilize clinical acumen, EKG, Troponin and serial changes to determine if it is an Acute Myocardial Infarction or myocardial injury due to an underlying chronic condition.       Results may be falsely decreased if patient taking Biotin.      [place] Draw [[id number]] Collected: 12/05/21 1940    Specimen: Blood Updated: 12/05/21 2045    Narrative:      The following orders were created for panel order [place] Draw.  Procedure                               Abnormality         Status                     ---------                               -----------         ------                     Green Top (Gel)[[id number]]                                  Final result               Lavender Top[[id number]]                                     Final result               Gold Top - SST[[id number]]                                   Final result               Light Blue Top[[id number]]                                   Final result                 Please view results for these tests on the individual orders.    Gold Top - SST [[id number]] Collected: 12/05/21 1940    Specimen: Blood Updated: 12/05/21 2045     Extra Tube Hold for add-ons.     Comment: Auto resulted.       Green Top (Gel) [[id number]] Collected: 12/05/21 1941    Specimen: Blood Updated: 12/05/21 2045     Extra Tube Hold for add-ons.     Comment: Auto resulted.       Light Blue Top [[id number]]  Collected: 12/05/21 1940    Specimen: Blood Updated: 12/05/21 2045     Extra Tube hold for add-on     Comment: Auto resulted       Lavender Top [[id number]] Collected: 12/05/21 1941    Specimen: Blood Updated: 12/05/21 2045     Extra Tube hold for add-on     Comment: Auto resulted       Comprehensive Metabolic Panel [[id number]]  (Abnormal) Collected: 12/05/21 1941    Specimen: Blood Updated: 12/05/21 2014     Glucose 102 mg/dL      BUN 24 mg/dL      Creatinine 1.21 mg/dL      Sodium 132 mmol/L      Potassium 4.1 mmol/L      Chloride 96 mmol/L      CO2 21.7 mmol/L      Calcium 8.9 mg/dL      Total Protein 6.2 g/dL      Albumin 3.60 g/dL      ALT (SGPT) 29 U/L      AST (SGOT) 35 U/L      Alkaline Phosphatase 54 U/L      Total Bilirubin 1.3 mg/dL      eGFR Non African Amer 58 mL/min/1.73      Globulin 2.6 gm/dL      A/G Ratio 1.4 g/dL      BUN/Creatinine Ratio 19.8     Anion Gap 14.3 mmol/L     Narrative:      GFR Normal >60  Chronic Kidney Disease <60  Kidney Failure <15      Lipase [[id number]]  (Normal) Collected: 12/05/21 1941    Specimen: Blood Updated: 12/05/21 2014     Lipase 33 U/L     CBC & Differential [[id number]]  (Abnormal) Collected: 12/05/21 1941    Specimen: Blood Updated: 12/05/21 1951    Narrative:      The following orders were created for panel order CBC & Differential.  Procedure                               Abnormality         Status                     ---------                               -----------         ------                     CBC Auto Differential[[id number]]        Abnormal            Final result                 Please view results for these tests on the individual orders.    CBC Auto Differential [[id number]]  (Abnormal) Collected: 12/05/21 1941    Specimen: Blood Updated: 12/05/21 1951     WBC 12.11 10*3/mm3      RBC 4.21 10*6/mm3      Hemoglobin 13.0 g/dL      Hematocrit 37.7 %      MCV 89.5 fL      MCH 30.9 pg      MCHC 34.5 g/dL      RDW 12.5 %      RDW-SD 39.9 fl      MPV 9.1 fL       Platelets 302 10*3/mm3      Neutrophil % 82.6 %      Lymphocyte % 5.0 %      Monocyte % 10.8 %      Eosinophil % 0.7 %      Basophil % 0.4 %      Immature Grans % 0.5 %      Neutrophils, Absolute 10.01 10*3/mm3      Lymphocytes, Absolute 0.60 10*3/mm3      Monocytes, Absolute 1.31 10*3/mm3      Eosinophils, Absolute 0.08 10*3/mm3      Basophils, Absolute 0.05 10*3/mm3      Immature Grans, Absolute 0.06 10*3/mm3      nRBC 0.0 /100 WBC            Imaging:  Imaging Results (Last 24 Hours)     Procedure Component Value Units Date/Time    FL Cholangiogram Operative [[id number]] Collected: 12/06/21 1100     Updated: 12/06/21 1123    Narrative:      FLUOROSCOPIC OPERATIVE CHOLANGIOGRAM     CLINICAL INFORMATION: Laparoscopic cholecystectomy.     FLUOROSCOPY TIME: 11 seconds, 59 images.     FINDINGS: Filling of the cystic duct attempted, however persistent  extravasation at the injection site.     This report was finalized on 12/6/2021 11:20 AM by Dr. Kvng Ferrara M.D.       US Gallbladder [[id number]] Collected: 12/06/21 0114     Updated: 12/06/21 0118    Narrative:      RIGHT UPPER QUADRANT ULTRASOUND     HISTORY:  Pain. Evaluate for cholecystitis.     COMPARISON: CT abdomen pelvis 12/5/2021     FINDINGS:  The liver exhibits normal echotexture and measures 14.37 m in length..   There is no intra or extrahepatic biliary duct dilatation. The common  duct measures 4mm. The gallbladder wall is thickened measuring 5 mm.  There is stones and sludge within the gallbladder and there is  gallbladder distention. These findings are consistent with  cholecystitis..       Visualized segments the pancreas appear within normal limits.  The right  kidney measures 10.4cm in length and there is no hydronephrosis. There  is no ascites.       Impression:      Findings consistent with cholecystitis with gallbladder  wall thickening and gallstones and sludge as well as distention.     This report was finalized on 12/6/2021 1:15 AM by   Vipin Ambriz M.D.       CT Abdomen Pelvis With Contrast [[id number]] Collected: 12/06/21 0011     Updated: 12/06/21 0040    Narrative:      CT ABDOMEN AND PELVIS WITH IV CONTRAST     HISTORY: Abdominal pain     TECHNIQUE:  CT includes axial imaging from the lung bases to the  trochanters with intravenous contrast and without use of oral contrast.  Data reconstructed in coronal and sagittal planes. Radiation dose  reduction techniques were utilized, including automated exposure control  and exposure modulation based on body size.     COMPARISON: CT abdomen and pelvis 12/30/2010     FINDINGS: There is abnormal gallbladder wall thickening as well as  pericholecystic stranding/inflammation. Within the gallbladder near the  neck there is a 6 mm polyp or faintly mineralized stone. No biliary  ductal dilatation is evident. Liver, spleen, adrenal glands, pancreas,  right kidney appear normal. There is an exophytic left lower pole  lateral renal low-density lesion that measures 1.9 cm. This contains  internal density that may represent motion artifact though a hyperdense  cyst or solid lesion are also in the differential diagnosis. Sigmoid  diverticulosis is present without evidence for diverticulitis.  Atherosclerotic calcifications are present involving the abdominal aorta  and iliac vasculature. There is no ascites. There is multilevel endplate  spur formation within the thoracic spine.       Impression:      1. Findings are consistent with acute cholecystitis. 6 mm polyp or  poorly mineralized stone near the gallbladder neck. No biliary ductal  dilatation.  2. 1.9 cm left renal exophytic lower pole lesion is denser than  typically seen for a simple cyst though this appearance may be due to  motion related artifact. Hyperdense cyst or solid lesion are also in the  differential diagnosis and this could be further evaluated with renal  sonogram or multiphasic CT.  3. Sigmoid diverticulosis without evidence for  diverticulitis. D     Discussed with Dr. Palmer in the emergency department on 12/06/2021 at  12:05 AM.      Radiation dose reduction techniques were utilized, including automated  exposure control and exposure modulation based on body size.     This report was finalized on 12/6/2021 12:37 AM by Dr. Vipin Ambriz M.D.       XR Chest 1 View [[id number]] Collected: 12/05/21 2213     Updated: 12/05/21 2224    Narrative:      CHEST SINGLE VIEW     HISTORY: Altered mental status.     COMPARISON: Two-view chest 12/02/2021.     FINDINGS: Cardiomediastinal silhouette is within normal limits. Lungs  appear clear and there is no evidence for pulmonary edema or pleural  effusion or infiltrate.       Impression:      No evidence for active disease in the chest.     This report was finalized on 12/5/2021 10:21 PM by Dr. Vipin Ambriz M.D.                Assessment:       Acute calculous cholecystitis    Anemia    Type 2 diabetes mellitus, without long-term current use of insulin (HCC)    Hypertension    CKD (chronic kidney disease) stage 2, GFR 60-89 ml/min    Renal lesion  urinary retention    Plan:   Short term options for retention are IC or temporary ortez  Small renal mass? Will need further imaging after recovery from cholecystectomy (renal mass protocol CT or MRI)    Calos Thomas Jr., MD  12/06/21  19:36 EST             WDL

## 2022-09-30 NOTE — ED ADULT TRIAGE NOTE - CCCP TRG CHIEF CMPLNT
CHIEF COMPLAINT: Multinodular Goiter   64 y.o. old being seen as a f/u Discovered to have thyroid nodules on the right and had an FNA of the right that was suspicious for neoplasm. Ultrasound initially done due to neck discomfort. Had a right lobectomy 10/26/21 (See path below) @ [facility]. States that had a transoral surgery. Told that one parathyroid removed due to it being enlarged. No known hypercalcemia prior to surgery.     Overall feeling well. Not taking Ca + D. Has been exercising. No difficulty swallowing. No change in voice. No fractures. No kidney stones.     9/21/21  RIGHT THYROID NODULE, FINE NEEDLE ASPIRATION:   --SUSPICIOUS FOR FOLLICULAR NEOPLASM, BETHESDA CATEGORY IV.   Comment: There are cytopathologic features which raise a degree of    suspicion for follicular variant of papillary carcinoma.       Right Thyroid Lobectomy 11/9/21  DIAGNOSIS     THYROID, RIGHT LOBE, THYROID LOBECTOMY:  ADENOMATOUS NODULE, 1.2 CM  HYPERCELLULAR PARATHYROID.  BACKGROUND THYROID WITH MULTINODULAR HYPERPLASIA.  NEGATIVE FOR MALIGNANCY.           PAST MEDICAL HISTORY/PAST SURGICAL HISTORY:  Reviewed in [facility]    SOCIAL HISTORY: Reviewed in [facility]    FAMILY HISTORY:  No known thyroid disease or thyroid cancer. Mother with lung cancer. No DM    MEDICATIONS/ALLERGIES: The patient's MedCard has been updated and reviewed.            PE:    GENERAL: Well developed, well nourished.  NECK: Supple, trachea midline, No palpable nodules. No surgical scar (had transoral surgery)  CHEST: Resp even and unlabored, CTA bilateral.  CARDIAC: RRR, S1, S2 heard, no murmurs, rubs, S3, or S4  SKIN: no acanthosis nigracans.     Latest Reference Range & Units 01/26/22 09:14   TSH 0.400 - 4.000 uIU/mL 2.846         ASSESSMENT/PLAN:  1. Multinodular Goiter- S/P Right lobectomy. Final Path benign. Had transoral surgery at [facility]. No complications after surgery. Currently asymptomatic.  Check TSH.  Discussed if levels normal, unlikely to  bilateral dvt require any thyroid hormone replacement.  Will get 1 more ultrasound to assess the subcentimeter nodules on the remaining lobe    2. Hypercellular Parathyroid gland- had right gland removed during surgery. Unsure if had hypercalcemia prior.  Check CMP.  If normal, would recommend getting a calcium with annual physical.      FOLLOWUP  Needs CMP, Thyroid Ultrasound, TSH

## 2022-10-23 NOTE — ED CDU PROVIDER DISPOSITION NOTE - NS ED ATTENDING STATEMENT MOD
I have personally performed a face to face diagnostic evaluation on this patient. I have reviewed the ACP note and agree with the history, exam and plan of care, except as noted. [facility]  Gynceology/OB  [address] [place] [address]  Phone: [phone]  Fax:

## 2024-10-24 NOTE — ED ADULT NURSE NOTE - MUSCULOSKELETAL WDL
pleasant, well nourished, well developed, in no acute distress , normal communication ability
Full range of motion of upper and lower extremities, no joint tenderness/swelling.
